# Patient Record
Sex: MALE | Race: BLACK OR AFRICAN AMERICAN | NOT HISPANIC OR LATINO | Employment: OTHER | URBAN - METROPOLITAN AREA
[De-identification: names, ages, dates, MRNs, and addresses within clinical notes are randomized per-mention and may not be internally consistent; named-entity substitution may affect disease eponyms.]

---

## 2017-01-04 ENCOUNTER — OFFICE VISIT (OUTPATIENT)
Dept: SURGERY | Facility: CLINIC | Age: 42
End: 2017-01-04
Payer: MEDICAID

## 2017-01-04 VITALS
WEIGHT: 118.19 LBS | HEART RATE: 97 BPM | DIASTOLIC BLOOD PRESSURE: 101 MMHG | SYSTOLIC BLOOD PRESSURE: 175 MMHG | HEIGHT: 64 IN | BODY MASS INDEX: 20.18 KG/M2

## 2017-01-04 DIAGNOSIS — Z93.2 ILEOSTOMY IN PLACE: Primary | ICD-10-CM

## 2017-01-04 PROCEDURE — 99213 OFFICE O/P EST LOW 20 MIN: CPT | Mod: PBBFAC | Performed by: COLON & RECTAL SURGERY

## 2017-01-04 PROCEDURE — 99024 POSTOP FOLLOW-UP VISIT: CPT | Mod: ,,, | Performed by: COLON & RECTAL SURGERY

## 2017-01-04 PROCEDURE — 99999 PR PBB SHADOW E&M-EST. PATIENT-LVL III: CPT | Mod: PBBFAC,,, | Performed by: COLON & RECTAL SURGERY

## 2017-01-04 NOTE — PROGRESS NOTES
Patient ID:  Marco Valadez Jr. is a 41 y.o. male     Chief Complaint:   Chief Complaint   Patient presents with    Follow-up    continent ileostomy        HPI: 10/31/16 : removal of ileoanal pouch and creation of continent ileostomy    HAs stool leaking around tube and excess output    11/12/16: exp lap for small bowel volvulus and SBO, lysis of adhesions, luis e gasrostomy tube. Bowels working Muro tube removed and replaced without difficuty. No need for G-tube      9/26/16 EUA, placement of setons. 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 Has drainage and nausea.    Had IPAA by Dr Marinelli in 2012 had several leaks and 13 operations. Now with frequent stools and seton in anus. Interested in repair of Gutiérrez Pouch.    ROS:        Constitutional: No fever, chills, activity or appetite change.      HENT: No hearing loss, facial swelling, neck pain or stiffness.       Eyes: No discharge, itching and visual disturbance.      Respiratory: No apnea, cough, choking or shortness of breath.       Cardiovascular: No leg swelling or chest pain      Gastrointestinal: No abdominal distention or change in bowel habbits     Genitourinary: No dysuria, frequency or flank pain.      Musculoskeletal: No arthralgias or gait problem.      Neurological: No dizziness, seizures or weakness.      Hematological: No adenopathy.      Psychiatric/Behavioral: No hallucinations or behavioral problems.       PE:    APPEARANCE: Well nourished, well developed, in no acute distress.   CHEST: Lungs clear. Normal respiratory effort.  CARDIOVASCULAR: Normal rhythm. No edema.  ABDOMEN: Soft. No tenderness or masses. healed incision continent ileostomy in RLQ,  Not inn all the way    Rectum:  Cream on skin vessel lopp in superficial fistula  Musculoskeletal: Symmetric, normal range of motion and strength.   Neurological: Alert and oriented to person, place, and time. Normal reflexes.   Skin: Skin is warm and dry.   Psychiatric: Normal mood  and affect. Behavior is normal and appropriate.      Tupe removed and relaced x 2 without difficulty    Impression:  Continent ileostomy  PLAN:  Start pouch training complete Flagyl RTC 1 month

## 2017-01-09 RX ORDER — HYDROCODONE BITARTRATE AND ACETAMINOPHEN 10; 325 MG/1; MG/1
1 TABLET ORAL EVERY 4 HOURS PRN
Qty: 60 TABLET | Refills: 0 | Status: SHIPPED | OUTPATIENT
Start: 2017-01-09 | End: 2019-08-13

## 2017-01-13 ENCOUNTER — TELEPHONE (OUTPATIENT)
Dept: SURGERY | Facility: CLINIC | Age: 42
End: 2017-01-13

## 2017-01-13 NOTE — TELEPHONE ENCOUNTER
----- Message from Marti Ho sent at 1/13/2017  8:07 AM CST -----  Contact: pt [phone]  Patient states he's having problems with his tubing and Bernie told him to call him this morning. Please call patient

## 2017-01-13 NOTE — TELEPHONE ENCOUNTER
Informed pt he will have to come in for the problems with the tube. The multiple stools are probably from the Citrate of magnesia he did Wed. Instructed him once stool is back to normal he is to do a dose of Miralax daily per Dr Riddle. He is aslo complaining of gas. Told him to get an OTC product for gas and keep a food dairy which may help identify what is causing the gas. Will make an appt if problem get worse with the tube.

## 2017-01-19 ENCOUNTER — PATIENT MESSAGE (OUTPATIENT)
Dept: SURGERY | Facility: CLINIC | Age: 42
End: 2017-01-19

## 2017-01-20 ENCOUNTER — TELEPHONE (OUTPATIENT)
Dept: WOUND CARE | Facility: CLINIC | Age: 42
End: 2017-01-20

## 2017-01-20 NOTE — TELEPHONE ENCOUNTER
Called [facility] regarding his continent ostomy supplies, could not reach danette but left message he needs Janay straight cath 30 fr #[phone]

## 2017-01-20 NOTE — TELEPHONE ENCOUNTER
----- Message from Bernie Rhodes LPN sent at 1/19/2017  2:11 PM CST -----  Contact: Shaylee maria guadalupe [facility]#[phone] ext#[phone]      ----- Message -----     From: Nina Gunter     Sent: 1/19/2017  10:36 AM       To: Venkatesh BROWN Staff    She's calling to see what supplies patient needs. Please call

## 2017-01-25 ENCOUNTER — PATIENT MESSAGE (OUTPATIENT)
Dept: SURGERY | Facility: CLINIC | Age: 42
End: 2017-01-25

## 2017-01-28 ENCOUNTER — PATIENT MESSAGE (OUTPATIENT)
Dept: SURGERY | Facility: CLINIC | Age: 42
End: 2017-01-28

## 2017-01-29 ENCOUNTER — PATIENT MESSAGE (OUTPATIENT)
Dept: SURGERY | Facility: CLINIC | Age: 42
End: 2017-01-29

## 2017-02-01 ENCOUNTER — OFFICE VISIT (OUTPATIENT)
Dept: SURGERY | Facility: CLINIC | Age: 42
End: 2017-02-01
Payer: MEDICAID

## 2017-02-01 VITALS
HEIGHT: 64 IN | DIASTOLIC BLOOD PRESSURE: 94 MMHG | HEART RATE: 84 BPM | TEMPERATURE: 98 F | WEIGHT: 112.88 LBS | SYSTOLIC BLOOD PRESSURE: 157 MMHG | BODY MASS INDEX: 19.27 KG/M2 | RESPIRATION RATE: 18 BRPM

## 2017-02-01 DIAGNOSIS — Z93.2 ILEOSTOMY IN PLACE: Primary | ICD-10-CM

## 2017-02-01 PROCEDURE — 99999 PR PBB SHADOW E&M-EST. PATIENT-LVL III: CPT | Mod: PBBFAC,,, | Performed by: COLON & RECTAL SURGERY

## 2017-02-01 PROCEDURE — 99024 POSTOP FOLLOW-UP VISIT: CPT | Mod: ,,, | Performed by: COLON & RECTAL SURGERY

## 2017-02-01 PROCEDURE — 99213 OFFICE O/P EST LOW 20 MIN: CPT | Mod: PBBFAC | Performed by: COLON & RECTAL SURGERY

## 2017-02-01 RX ORDER — POLYETHYLENE GLYCOL 3350 17 G/17G
POWDER, FOR SOLUTION ORAL
COMMUNITY
End: 2018-04-25

## 2017-02-01 NOTE — PROGRESS NOTES
Patient ID:  Marco Valadez Jr. is a 41 y.o. male     Chief Complaint:   Chief Complaint   Patient presents with    Follow-up        HPI: Doing better tubes were bending. On probiotics      10/31/16 : removal of ileoanal pouch and creation of continent ileostomy    11/12/16: exp lap for small bowel volvulus and SBO, lysis of adhesions, luis e gasrostomy tube. Bowels working Muro tube removed and replaced without difficuty. No need for G-tube      9/26/16 EUA, placement of setons. 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 Has drainage and nausea.    Had IPAA by Dr Marinelli in 2012 had several leaks and 13 operations. Now with frequent stools and seton in anus. Interested in repair of Gutiérrez Pouch.    ROS:        Constitutional: No fever, chills, activity or appetite change.      HENT: No hearing loss, facial swelling, neck pain or stiffness.       Eyes: No discharge, itching and visual disturbance.      Respiratory: No apnea, cough, choking or shortness of breath.       Cardiovascular: No leg swelling or chest pain      Gastrointestinal: No abdominal distention or change in bowel habbits     Genitourinary: No dysuria, frequency or flank pain.      Musculoskeletal: No arthralgias or gait problem.      Neurological: No dizziness, seizures or weakness.      Hematological: No adenopathy.      Psychiatric/Behavioral: No hallucinations or behavioral problems.       PE:    APPEARANCE: Well nourished, well developed, in no acute distress.   CHEST: Lungs clear. Normal respiratory effort.  CARDIOVASCULAR: Normal rhythm. No edema.  ABDOMEN: Soft. No tenderness or masses. healed incision continent ileostomy in RLQ,  Not inn all the way    Rectum:  Cream on skin vessel lopp in superficial fistula  Musculoskeletal: Symmetric, normal range of motion and strength.   Neurological: Alert and oriented to person, place, and time. Normal reflexes.   Skin: Skin is warm and dry.   Psychiatric: Normal mood and affect. Behavior is  normal and appropriate.      Tupe removed and relaced x 2 without difficulty    Impression:  Continent ileostomy  PLAN: Instructed on tube insertion RTC 3 months

## 2017-02-07 ENCOUNTER — PATIENT MESSAGE (OUTPATIENT)
Dept: SURGERY | Facility: CLINIC | Age: 42
End: 2017-02-07

## 2017-02-08 ENCOUNTER — PATIENT MESSAGE (OUTPATIENT)
Dept: SURGERY | Facility: CLINIC | Age: 42
End: 2017-02-08

## 2017-02-09 ENCOUNTER — PATIENT MESSAGE (OUTPATIENT)
Dept: SURGERY | Facility: CLINIC | Age: 42
End: 2017-02-09

## 2017-02-10 RX ORDER — METRONIDAZOLE 250 MG/1
250 TABLET ORAL 3 TIMES DAILY
Qty: 90 TABLET | Refills: 2 | Status: SHIPPED | OUTPATIENT
Start: 2017-02-10 | End: 2019-12-09 | Stop reason: SDUPTHER

## 2017-02-10 RX ORDER — DICYCLOMINE HYDROCHLORIDE 20 MG/1
20 TABLET ORAL EVERY 6 HOURS
Qty: 120 TABLET | Refills: 0 | Status: SHIPPED | OUTPATIENT
Start: 2017-02-10 | End: 2017-03-13

## 2017-02-15 ENCOUNTER — OFFICE VISIT (OUTPATIENT)
Dept: SURGERY | Facility: CLINIC | Age: 42
End: 2017-02-15
Payer: MEDICAID

## 2017-02-15 VITALS
HEIGHT: 64 IN | DIASTOLIC BLOOD PRESSURE: 81 MMHG | SYSTOLIC BLOOD PRESSURE: 132 MMHG | HEART RATE: 95 BPM | BODY MASS INDEX: 20.06 KG/M2 | WEIGHT: 117.5 LBS

## 2017-02-15 DIAGNOSIS — Z93.2 ILEOSTOMY IN PLACE: Primary | ICD-10-CM

## 2017-02-15 PROCEDURE — 99999 PR PBB SHADOW E&M-EST. PATIENT-LVL III: CPT | Mod: PBBFAC,,, | Performed by: COLON & RECTAL SURGERY

## 2017-02-15 PROCEDURE — 99024 POSTOP FOLLOW-UP VISIT: CPT | Mod: ,,, | Performed by: COLON & RECTAL SURGERY

## 2017-02-15 PROCEDURE — 99213 OFFICE O/P EST LOW 20 MIN: CPT | Mod: PBBFAC | Performed by: COLON & RECTAL SURGERY

## 2017-02-15 NOTE — PROGRESS NOTES
Patient ID:  Marco Valadez Jr. is a 41 y.o. male     Chief Complaint:   Chief Complaint   Patient presents with    Follow-up    continent ileostomy        HPI: A ssome leakage from stoma and has to cannulate frequently.    10/31/16 : removal of ileoanal pouch and creation of continent ileostomy    11/12/16: exp lap for small bowel volvulus and SBO, lysis of adhesions, luis e gasrostomy tube. Bowels working Muro tube removed and replaced without difficuty. No need for G-tube      9/26/16 EUA, placement of setons. 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 Has drainage and nausea.    Had IPAA by Dr Marinelli in 2012 had several leaks and 13 operations. Now with frequent stools and seton in anus. Interested in repair of Gutiérrez Pouch.    ROS:        Constitutional: No fever, chills, activity or appetite change.      HENT: No hearing loss, facial swelling, neck pain or stiffness.       Eyes: No discharge, itching and visual disturbance.      Respiratory: No apnea, cough, choking or shortness of breath.       Cardiovascular: No leg swelling or chest pain      Gastrointestinal: No abdominal distention or change in bowel habbits     Genitourinary: No dysuria, frequency or flank pain.      Musculoskeletal: No arthralgias or gait problem.      Neurological: No dizziness, seizures or weakness.      Hematological: No adenopathy.      Psychiatric/Behavioral: No hallucinations or behavioral problems.       PE:    APPEARANCE: Well nourished, well developed, in no acute distress.   CHEST: Lungs clear. Normal respiratory effort.  CARDIOVASCULAR: Normal rhythm. No edema.  ABDOMEN: Soft. No tenderness or masses. healed incision continent ileostomy in RLQ,  Not inn all the way    Rectum:  Cream on skin vessel lopp in superficial fistula  Musculoskeletal: Symmetric, normal range of motion and strength.   Neurological: Alert and oriented to person, place, and time. Normal reflexes.   Skin: Skin is warm and dry.   Psychiatric:  Normal mood and affect. Behavior is normal and appropriate.     PROCEDURE: Flexible Endoscopy of pouch   Scope # Olymp [id number]    With informed consent the flexible sigmoidoscope was passed 15  cm under direcet vision. Prep quality: fair    Findings: dombbellshaped pouch with adequate intussception. Distance of valve measured and patient given a tube with marking  EBL: None    The procedure was tolerated well.        Impression:  Continent ileostomy  PLAN: Instructed on tube insertion RTC 1 months

## 2017-02-16 ENCOUNTER — PATIENT MESSAGE (OUTPATIENT)
Dept: SURGERY | Facility: CLINIC | Age: 42
End: 2017-02-16

## 2017-02-20 ENCOUNTER — PATIENT MESSAGE (OUTPATIENT)
Dept: SURGERY | Facility: CLINIC | Age: 42
End: 2017-02-20

## 2017-02-21 ENCOUNTER — PATIENT MESSAGE (OUTPATIENT)
Dept: SURGERY | Facility: CLINIC | Age: 42
End: 2017-02-21

## 2017-02-22 ENCOUNTER — OFFICE VISIT (OUTPATIENT)
Dept: SURGERY | Facility: CLINIC | Age: 42
End: 2017-02-22
Payer: MEDICARE

## 2017-02-22 VITALS
HEIGHT: 64 IN | SYSTOLIC BLOOD PRESSURE: 144 MMHG | HEART RATE: 77 BPM | BODY MASS INDEX: 20.36 KG/M2 | DIASTOLIC BLOOD PRESSURE: 92 MMHG | WEIGHT: 119.25 LBS

## 2017-02-22 DIAGNOSIS — K94.13 ILEOSTOMY DYSFUNCTION: Primary | ICD-10-CM

## 2017-02-22 DIAGNOSIS — Z93.2 ILEOSTOMY IN PLACE: ICD-10-CM

## 2017-02-22 PROCEDURE — 99214 OFFICE O/P EST MOD 30 MIN: CPT | Mod: S$PBB,,, | Performed by: COLON & RECTAL SURGERY

## 2017-02-22 PROCEDURE — 99213 OFFICE O/P EST LOW 20 MIN: CPT | Mod: PBBFAC | Performed by: COLON & RECTAL SURGERY

## 2017-02-22 PROCEDURE — 99999 PR PBB SHADOW E&M-EST. PATIENT-LVL III: CPT | Mod: PBBFAC,,, | Performed by: COLON & RECTAL SURGERY

## 2017-02-22 RX ORDER — METRONIDAZOLE 500 MG/100ML
500 INJECTION, SOLUTION INTRAVENOUS
Status: CANCELLED | OUTPATIENT
Start: 2017-02-22

## 2017-02-22 RX ORDER — ACETAMINOPHEN 10 MG/ML
1000 INJECTION, SOLUTION INTRAVENOUS
Status: CANCELLED | OUTPATIENT
Start: 2017-02-22 | End: 2017-02-22

## 2017-02-22 RX ORDER — SODIUM CHLORIDE 9 MG/ML
INJECTION, SOLUTION INTRAVENOUS CONTINUOUS
Status: CANCELLED | OUTPATIENT
Start: 2017-02-22

## 2017-02-22 NOTE — H&P
Marco Valadez Jr. is a 41 y.o. male     Chief Complaint:   Chief Complaint   Patient presents with    Follow-up        HPI:Continues to leak from stoma. Had endosocpy last visit with bilobed pouch May not be adequately emptying      10/31/16 : removal of ileoanal pouch and creation of continent ileostomy    11/12/16: exp lap for small bowel volvulus and SBO, lysis of adhesions, luis e gasrostomy tube. Bowels working Muro tube removed and replaced without difficuty. No need for G-tube      9/26/16 EUA, placement of setons. 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 Has drainage and nausea.    Had IPAA by Dr Marinelli in 2012 had several leaks and 13 operations. Now with frequent stools and seton in anus. Interested in repair of Gutiérrez Pouch.    ROS:        Constitutional: No fever, chills, activity or appetite change.      HENT: No hearing loss, facial swelling, neck pain or stiffness.       Eyes: No discharge, itching and visual disturbance.      Respiratory: No apnea, cough, choking or shortness of breath.       Cardiovascular: No leg swelling or chest pain      Gastrointestinal: No abdominal distention or change in bowel habbits     Genitourinary: No dysuria, frequency or flank pain.      Musculoskeletal: No arthralgias or gait problem.      Neurological: No dizziness, seizures or weakness.      Hematological: No adenopathy.      Psychiatric/Behavioral: No hallucinations or behavioral problems.       PE:    APPEARANCE: Well nourished, well developed, in no acute distress.   CHEST: Lungs clear. Normal respiratory effort.  CARDIOVASCULAR: Normal rhythm. No edema.  ABDOMEN: Soft. No tenderness or masses. healed incision continent ileostomy in RLQ,  Not inn all the way    Rectum:  Cream on skin vessel lopp in superficial fistula  Musculoskeletal: Symmetric, normal range of motion and strength.   Neurological: Alert and oriented to person, place, and time. Normal reflexes.   Skin: Skin is warm and dry.    Psychiatric: Normal mood and affect. Behavior is normal and appropriate.          Impression:  Continent ileostomy with incontinence  PLAN: DIscussed options. Will explore and revise pouch March 6.  I have explained the procedure including indications, alternatives, expected outcomes and potential complications. The patient appears to understand and gives informed consent. The patient is medically ready for surgery.

## 2017-02-22 NOTE — PROGRESS NOTES
Patient ID:  Marco Valadez Jr. is a 41 y.o. male     Chief Complaint:   Chief Complaint   Patient presents with    Follow-up        HPI:Continues to leak from stoma. Had endosocpy last visit with bilobed pouch May not be adequately emptying      10/31/16 : removal of ileoanal pouch and creation of continent ileostomy    11/12/16: exp lap for small bowel volvulus and SBO, lysis of adhesions, luis e gasrostomy tube. Bowels working Muro tube removed and replaced without difficuty. No need for G-tube      9/26/16 EUA, placement of setons. 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 Has drainage and nausea.    Had IPAA by Dr Marinelli in 2012 had several leaks and 13 operations. Now with frequent stools and seton in anus. Interested in repair of Gutiérrez Pouch.    ROS:        Constitutional: No fever, chills, activity or appetite change.      HENT: No hearing loss, facial swelling, neck pain or stiffness.       Eyes: No discharge, itching and visual disturbance.      Respiratory: No apnea, cough, choking or shortness of breath.       Cardiovascular: No leg swelling or chest pain      Gastrointestinal: No abdominal distention or change in bowel habbits     Genitourinary: No dysuria, frequency or flank pain.      Musculoskeletal: No arthralgias or gait problem.      Neurological: No dizziness, seizures or weakness.      Hematological: No adenopathy.      Psychiatric/Behavioral: No hallucinations or behavioral problems.       PE:    APPEARANCE: Well nourished, well developed, in no acute distress.   CHEST: Lungs clear. Normal respiratory effort.  CARDIOVASCULAR: Normal rhythm. No edema.  ABDOMEN: Soft. No tenderness or masses. healed incision continent ileostomy in RLQ,  Not inn all the way    Rectum:  Cream on skin vessel lopp in superficial fistula  Musculoskeletal: Symmetric, normal range of motion and strength.   Neurological: Alert and oriented to person, place, and time. Normal reflexes.   Skin: Skin is warm and  dry.   Psychiatric: Normal mood and affect. Behavior is normal and appropriate.          Impression:  Continent ileostomy with incontinence  PLAN: DIscussed options. Will explore and revise pouch March 6.  I have explained the procedure including indications, alternatives, expected outcomes and potential complications. The patient appears to understand and gives informed consent. The patient is medically ready for surgery.

## 2017-02-22 NOTE — PROGRESS NOTES
PreOp instructions reviewed with pt: NPO after MN, anti-bacterial showering guidelines, and bowel prep: Clears - Clear liquids only.  Clinic nurse's contact info provided - instructed to call day prior to surgery to confirm arrival time; also if any questions or scheduling concerns.  Pt verbalized understanding and given written copies of all information provided.

## 2017-03-03 NOTE — PRE-PROCEDURE INSTRUCTIONS
Preop instructions: NPO after midnight or 8 hours prior to procedure time, shower instructions, directions, leave all valuables at home, medication instructions for PM prior & am of procedure explained. Patient stated an understanding.    Patient denies any side effects or issues with anesthesia or sedation.

## 2017-03-06 ENCOUNTER — ANESTHESIA EVENT (OUTPATIENT)
Dept: SURGERY | Facility: HOSPITAL | Age: 42
DRG: 330 | End: 2017-03-06
Payer: MEDICARE

## 2017-03-06 ENCOUNTER — HOSPITAL ENCOUNTER (INPATIENT)
Facility: HOSPITAL | Age: 42
LOS: 3 days | Discharge: HOME | DRG: 330 | End: 2017-03-09
Attending: COLON & RECTAL SURGERY | Admitting: COLON & RECTAL SURGERY
Payer: MEDICARE

## 2017-03-06 ENCOUNTER — ANESTHESIA (OUTPATIENT)
Dept: SURGERY | Facility: HOSPITAL | Age: 42
DRG: 330 | End: 2017-03-06
Payer: MEDICARE

## 2017-03-06 DIAGNOSIS — K94.13 ILEOSTOMY DYSFUNCTION: Primary | ICD-10-CM

## 2017-03-06 PROCEDURE — 63600175 PHARM REV CODE 636 W HCPCS: Performed by: SURGERY

## 2017-03-06 PROCEDURE — 27100025 HC TUBING, SET FLUID WARMER: Performed by: NURSE ANESTHETIST, CERTIFIED REGISTERED

## 2017-03-06 PROCEDURE — 25000003 PHARM REV CODE 250: Performed by: NURSE ANESTHETIST, CERTIFIED REGISTERED

## 2017-03-06 PROCEDURE — D9220A PRA ANESTHESIA: Mod: ANES,,, | Performed by: ANESTHESIOLOGY

## 2017-03-06 PROCEDURE — 20600001 HC STEP DOWN PRIVATE ROOM

## 2017-03-06 PROCEDURE — 36000708 HC OR TIME LEV III 1ST 15 MIN: Performed by: COLON & RECTAL SURGERY

## 2017-03-06 PROCEDURE — D9220A PRA ANESTHESIA: Mod: CRNA,,, | Performed by: NURSE ANESTHETIST, CERTIFIED REGISTERED

## 2017-03-06 PROCEDURE — 71000033 HC RECOVERY, INTIAL HOUR: Performed by: COLON & RECTAL SURGERY

## 2017-03-06 PROCEDURE — 25000003 PHARM REV CODE 250: Performed by: COLON & RECTAL SURGERY

## 2017-03-06 PROCEDURE — 37000009 HC ANESTHESIA EA ADD 15 MINS: Performed by: COLON & RECTAL SURGERY

## 2017-03-06 PROCEDURE — 25000003 PHARM REV CODE 250: Performed by: STUDENT IN AN ORGANIZED HEALTH CARE EDUCATION/TRAINING PROGRAM

## 2017-03-06 PROCEDURE — 36000709 HC OR TIME LEV III EA ADD 15 MIN: Performed by: COLON & RECTAL SURGERY

## 2017-03-06 PROCEDURE — 27201423 OPTIME MED/SURG SUP & DEVICES STERILE SUPPLY: Performed by: COLON & RECTAL SURGERY

## 2017-03-06 PROCEDURE — 25000003 PHARM REV CODE 250: Performed by: SURGERY

## 2017-03-06 PROCEDURE — 63600175 PHARM REV CODE 636 W HCPCS: Performed by: COLON & RECTAL SURGERY

## 2017-03-06 PROCEDURE — 37000008 HC ANESTHESIA 1ST 15 MINUTES: Performed by: COLON & RECTAL SURGERY

## 2017-03-06 PROCEDURE — 63600175 PHARM REV CODE 636 W HCPCS: Performed by: NURSE ANESTHETIST, CERTIFIED REGISTERED

## 2017-03-06 PROCEDURE — 44312 REVISION OF ILEOSTOMY: CPT | Mod: ,,, | Performed by: COLON & RECTAL SURGERY

## 2017-03-06 PROCEDURE — 0DQB0ZZ REPAIR ILEUM, OPEN APPROACH: ICD-10-PCS | Performed by: COLON & RECTAL SURGERY

## 2017-03-06 PROCEDURE — 71000039 HC RECOVERY, EACH ADD'L HOUR: Performed by: COLON & RECTAL SURGERY

## 2017-03-06 PROCEDURE — 25000003 PHARM REV CODE 250: Performed by: ANESTHESIOLOGY

## 2017-03-06 RX ORDER — LIDOCAINE HYDROCHLORIDE 10 MG/ML
1 INJECTION, SOLUTION EPIDURAL; INFILTRATION; INTRACAUDAL; PERINEURAL ONCE
Status: COMPLETED | OUTPATIENT
Start: 2017-03-06 | End: 2017-03-06

## 2017-03-06 RX ORDER — SODIUM CHLORIDE 9 MG/ML
INJECTION, SOLUTION INTRAVENOUS CONTINUOUS
Status: DISCONTINUED | OUTPATIENT
Start: 2017-03-06 | End: 2017-03-06

## 2017-03-06 RX ORDER — ACETAMINOPHEN 10 MG/ML
1000 INJECTION, SOLUTION INTRAVENOUS
Status: COMPLETED | OUTPATIENT
Start: 2017-03-06 | End: 2017-03-06

## 2017-03-06 RX ORDER — ENOXAPARIN SODIUM 100 MG/ML
40 INJECTION SUBCUTANEOUS
Status: DISCONTINUED | OUTPATIENT
Start: 2017-03-07 | End: 2017-03-09 | Stop reason: HOSPADM

## 2017-03-06 RX ORDER — BUPIVACAINE HYDROCHLORIDE 2.5 MG/ML
INJECTION, SOLUTION EPIDURAL; INFILTRATION; INTRACAUDAL
Status: DISCONTINUED | OUTPATIENT
Start: 2017-03-06 | End: 2017-03-06

## 2017-03-06 RX ORDER — PROPOFOL 10 MG/ML
VIAL (ML) INTRAVENOUS
Status: DISCONTINUED | OUTPATIENT
Start: 2017-03-06 | End: 2017-03-06

## 2017-03-06 RX ORDER — ACETAMINOPHEN 325 MG/1
650 TABLET ORAL EVERY 4 HOURS
Status: DISPENSED | OUTPATIENT
Start: 2017-03-06 | End: 2017-03-07

## 2017-03-06 RX ORDER — GLYCOPYRROLATE 0.2 MG/ML
INJECTION INTRAMUSCULAR; INTRAVENOUS
Status: DISCONTINUED | OUTPATIENT
Start: 2017-03-06 | End: 2017-03-06

## 2017-03-06 RX ORDER — ROCURONIUM BROMIDE 10 MG/ML
INJECTION, SOLUTION INTRAVENOUS
Status: DISCONTINUED | OUTPATIENT
Start: 2017-03-06 | End: 2017-03-06

## 2017-03-06 RX ORDER — NALOXONE HCL 0.4 MG/ML
0.02 VIAL (ML) INJECTION
Status: DISCONTINUED | OUTPATIENT
Start: 2017-03-06 | End: 2017-03-07

## 2017-03-06 RX ORDER — IPRATROPIUM BROMIDE AND ALBUTEROL SULFATE 2.5; .5 MG/3ML; MG/3ML
3 SOLUTION RESPIRATORY (INHALATION) EVERY 4 HOURS PRN
Status: DISCONTINUED | OUTPATIENT
Start: 2017-03-06 | End: 2017-03-06 | Stop reason: HOSPADM

## 2017-03-06 RX ORDER — SUCCINYLCHOLINE CHLORIDE 20 MG/ML
INJECTION INTRAMUSCULAR; INTRAVENOUS
Status: DISCONTINUED | OUTPATIENT
Start: 2017-03-06 | End: 2017-03-06

## 2017-03-06 RX ORDER — ONDANSETRON 2 MG/ML
4 INJECTION INTRAMUSCULAR; INTRAVENOUS EVERY 8 HOURS PRN
Status: DISCONTINUED | OUTPATIENT
Start: 2017-03-06 | End: 2017-03-09 | Stop reason: HOSPADM

## 2017-03-06 RX ORDER — KETAMINE HCL IN 0.9 % NACL 50 MG/5 ML
SYRINGE (ML) INTRAVENOUS
Status: DISCONTINUED | OUTPATIENT
Start: 2017-03-06 | End: 2017-03-06

## 2017-03-06 RX ORDER — LIDOCAINE HCL/PF 100 MG/5ML
SYRINGE (ML) INTRAVENOUS
Status: DISCONTINUED | OUTPATIENT
Start: 2017-03-06 | End: 2017-03-06

## 2017-03-06 RX ORDER — METRONIDAZOLE 500 MG/100ML
500 INJECTION, SOLUTION INTRAVENOUS
Status: COMPLETED | OUTPATIENT
Start: 2017-03-06 | End: 2017-03-06

## 2017-03-06 RX ORDER — DIPHENHYDRAMINE HYDROCHLORIDE 50 MG/ML
12.5 INJECTION INTRAMUSCULAR; INTRAVENOUS EVERY 4 HOURS PRN
Status: DISCONTINUED | OUTPATIENT
Start: 2017-03-06 | End: 2017-03-09 | Stop reason: HOSPADM

## 2017-03-06 RX ORDER — NEOSTIGMINE METHYLSULFATE 1 MG/ML
INJECTION, SOLUTION INTRAVENOUS
Status: DISCONTINUED | OUTPATIENT
Start: 2017-03-06 | End: 2017-03-06

## 2017-03-06 RX ORDER — CEFAZOLIN SODIUM 1 G/3ML
INJECTION, POWDER, FOR SOLUTION INTRAMUSCULAR; INTRAVENOUS
Status: DISCONTINUED | OUTPATIENT
Start: 2017-03-06 | End: 2017-03-06

## 2017-03-06 RX ORDER — POLYETHYLENE GLYCOL 3350 17 G/17G
17 POWDER, FOR SOLUTION ORAL DAILY
Status: DISCONTINUED | OUTPATIENT
Start: 2017-03-06 | End: 2017-03-09 | Stop reason: HOSPADM

## 2017-03-06 RX ORDER — MIDAZOLAM HYDROCHLORIDE 1 MG/ML
INJECTION, SOLUTION INTRAMUSCULAR; INTRAVENOUS
Status: DISCONTINUED | OUTPATIENT
Start: 2017-03-06 | End: 2017-03-06

## 2017-03-06 RX ORDER — SODIUM CHLORIDE 9 MG/ML
INJECTION, SOLUTION INTRAVENOUS CONTINUOUS
Status: DISCONTINUED | OUTPATIENT
Start: 2017-03-06 | End: 2017-03-08

## 2017-03-06 RX ORDER — ONDANSETRON 2 MG/ML
INJECTION INTRAMUSCULAR; INTRAVENOUS
Status: DISCONTINUED | OUTPATIENT
Start: 2017-03-06 | End: 2017-03-06

## 2017-03-06 RX ORDER — VALSARTAN 160 MG/1
320 TABLET ORAL DAILY
Status: DISCONTINUED | OUTPATIENT
Start: 2017-03-06 | End: 2017-03-07

## 2017-03-06 RX ORDER — HYDROMORPHONE HCL IN 0.9% NACL 6 MG/30 ML
PATIENT CONTROLLED ANALGESIA SYRINGE INTRAVENOUS CONTINUOUS
Status: DISCONTINUED | OUTPATIENT
Start: 2017-03-06 | End: 2017-03-07

## 2017-03-06 RX ORDER — FENTANYL CITRATE 50 UG/ML
INJECTION, SOLUTION INTRAMUSCULAR; INTRAVENOUS
Status: DISCONTINUED | OUTPATIENT
Start: 2017-03-06 | End: 2017-03-06

## 2017-03-06 RX ORDER — DICYCLOMINE HYDROCHLORIDE 20 MG/1
20 TABLET ORAL EVERY 6 HOURS
Status: DISCONTINUED | OUTPATIENT
Start: 2017-03-06 | End: 2017-03-09 | Stop reason: HOSPADM

## 2017-03-06 RX ADMIN — ROCURONIUM BROMIDE 25 MG: 10 INJECTION, SOLUTION INTRAVENOUS at 07:03

## 2017-03-06 RX ADMIN — Medication 10 MG: at 07:03

## 2017-03-06 RX ADMIN — MIDAZOLAM HYDROCHLORIDE 2 MG: 1 INJECTION, SOLUTION INTRAMUSCULAR; INTRAVENOUS at 07:03

## 2017-03-06 RX ADMIN — SUCCINYLCHOLINE CHLORIDE 160 MG: 20 INJECTION, SOLUTION INTRAMUSCULAR; INTRAVENOUS at 07:03

## 2017-03-06 RX ADMIN — SODIUM CHLORIDE: 0.9 INJECTION, SOLUTION INTRAVENOUS at 10:03

## 2017-03-06 RX ADMIN — Medication 800 MG: at 06:03

## 2017-03-06 RX ADMIN — GLYCOPYRROLATE 0.4 MG: 0.2 INJECTION, SOLUTION INTRAMUSCULAR; INTRAVENOUS at 09:03

## 2017-03-06 RX ADMIN — Medication: at 10:03

## 2017-03-06 RX ADMIN — ACETAMINOPHEN 650 MG: 325 TABLET ORAL at 10:03

## 2017-03-06 RX ADMIN — ONDANSETRON 4 MG: 2 INJECTION INTRAMUSCULAR; INTRAVENOUS at 09:03

## 2017-03-06 RX ADMIN — NEOSTIGMINE METHYLSULFATE 4 MG: 1 INJECTION INTRAVENOUS at 09:03

## 2017-03-06 RX ADMIN — ACETAMINOPHEN 650 MG: 325 TABLET ORAL at 02:03

## 2017-03-06 RX ADMIN — FENTANYL CITRATE 50 MCG: 50 INJECTION, SOLUTION INTRAMUSCULAR; INTRAVENOUS at 07:03

## 2017-03-06 RX ADMIN — METRONIDAZOLE 500 MG: 500 SOLUTION INTRAVENOUS at 07:03

## 2017-03-06 RX ADMIN — PROPOFOL 150 MG: 10 INJECTION, EMULSION INTRAVENOUS at 07:03

## 2017-03-06 RX ADMIN — SODIUM CHLORIDE 1000 ML: 0.9 INJECTION, SOLUTION INTRAVENOUS at 08:03

## 2017-03-06 RX ADMIN — ACETAMINOPHEN 650 MG: 325 TABLET ORAL at 06:03

## 2017-03-06 RX ADMIN — Medication: at 06:03

## 2017-03-06 RX ADMIN — VALSARTAN 320 MG: 160 TABLET, FILM COATED ORAL at 12:03

## 2017-03-06 RX ADMIN — SODIUM CHLORIDE: 0.9 INJECTION, SOLUTION INTRAVENOUS at 08:03

## 2017-03-06 RX ADMIN — Medication 20 MG: at 07:03

## 2017-03-06 RX ADMIN — SODIUM CHLORIDE, SODIUM GLUCONATE, SODIUM ACETATE, POTASSIUM CHLORIDE, MAGNESIUM CHLORIDE, SODIUM PHOSPHATE, DIBASIC, AND POTASSIUM PHOSPHATE: .53; .5; .37; .037; .03; .012; .00082 INJECTION, SOLUTION INTRAVENOUS at 08:03

## 2017-03-06 RX ADMIN — LIDOCAINE HYDROCHLORIDE 90 MG: 20 INJECTION, SOLUTION INTRAVENOUS at 07:03

## 2017-03-06 RX ADMIN — SODIUM CHLORIDE, SODIUM GLUCONATE, SODIUM ACETATE, POTASSIUM CHLORIDE, MAGNESIUM CHLORIDE, SODIUM PHOSPHATE, DIBASIC, AND POTASSIUM PHOSPHATE: .53; .5; .37; .037; .03; .012; .00082 INJECTION, SOLUTION INTRAVENOUS at 09:03

## 2017-03-06 RX ADMIN — ROCURONIUM BROMIDE 10 MG: 10 INJECTION, SOLUTION INTRAVENOUS at 08:03

## 2017-03-06 RX ADMIN — GENTAMICIN SULFATE 271 MG: 40 INJECTION, SOLUTION INTRAMUSCULAR; INTRAVENOUS at 07:03

## 2017-03-06 RX ADMIN — FENTANYL CITRATE 100 MCG: 50 INJECTION, SOLUTION INTRAMUSCULAR; INTRAVENOUS at 07:03

## 2017-03-06 RX ADMIN — SODIUM CHLORIDE: 0.9 INJECTION, SOLUTION INTRAVENOUS at 05:03

## 2017-03-06 RX ADMIN — DICYCLOMINE HYDROCHLORIDE 20 MG: 20 TABLET ORAL at 06:03

## 2017-03-06 RX ADMIN — PROPOFOL 50 MG: 10 INJECTION, EMULSION INTRAVENOUS at 07:03

## 2017-03-06 RX ADMIN — DICYCLOMINE HYDROCHLORIDE 20 MG: 20 TABLET ORAL at 12:03

## 2017-03-06 RX ADMIN — ACETAMINOPHEN 1000 MG: 10 INJECTION, SOLUTION INTRAVENOUS at 05:03

## 2017-03-06 RX ADMIN — SODIUM CHLORIDE 1000 ML: 0.9 INJECTION, SOLUTION INTRAVENOUS at 06:03

## 2017-03-06 RX ADMIN — POLYETHYLENE GLYCOL 3350 17 G: 17 POWDER, FOR SOLUTION ORAL at 10:03

## 2017-03-06 RX ADMIN — SODIUM CHLORIDE, SODIUM GLUCONATE, SODIUM ACETATE, POTASSIUM CHLORIDE, MAGNESIUM CHLORIDE, SODIUM PHOSPHATE, DIBASIC, AND POTASSIUM PHOSPHATE: .53; .5; .37; .037; .03; .012; .00082 INJECTION, SOLUTION INTRAVENOUS at 07:03

## 2017-03-06 RX ADMIN — ROCURONIUM BROMIDE 5 MG: 10 INJECTION, SOLUTION INTRAVENOUS at 07:03

## 2017-03-06 RX ADMIN — SODIUM CHLORIDE: 0.9 INJECTION, SOLUTION INTRAVENOUS at 06:03

## 2017-03-06 RX ADMIN — Medication 20 MG: at 08:03

## 2017-03-06 RX ADMIN — DIPHENHYDRAMINE HYDROCHLORIDE 12.5 MG: 50 INJECTION, SOLUTION INTRAMUSCULAR; INTRAVENOUS at 02:03

## 2017-03-06 RX ADMIN — LIDOCAINE HYDROCHLORIDE 0.1 ML: 10 INJECTION, SOLUTION EPIDURAL; INFILTRATION; INTRACAUDAL; PERINEURAL at 05:03

## 2017-03-06 RX ADMIN — SUCCINYLCHOLINE CHLORIDE 40 MG: 20 INJECTION, SOLUTION INTRAMUSCULAR; INTRAVENOUS at 07:03

## 2017-03-06 NOTE — OP NOTE
[facility]  General Surgery  Operative Note    SUMMARY     Date of Procedure: 3/6/2017     Procedure: Procedure(s) (LRB):  REVISION-ILEOSTOMY, complicated, revise/repair continent ileostomy (N/A)       Surgeon(s) and Role:     * Mateusz Riddle MD - Primary     * Solitario Odonnell MD - Resident - Assisting    Pre-Operative Diagnosis: Ileostomy dysfunction [K94.13]    Post-Operative Diagnosis: Post-Op Diagnosis Codes:     * Ileostomy dysfunction [K94.13]    Anesthesia: General    HPI: Mr. Valadez is a 40 yo M with hx continent ileostomy not functioning properly here for ileostomy revision.    Description of the Findings of the Procedure: Reduction of continent ileostomy with resultant malfunction    Procedure in Detail: Mr. Valadez was intubated under general anesthesia and his abdomen prepped and draped in sterile fashion. His lower midline incision was re-opened and adhesiolysis was performed. His stoma was then taken down by incising the muco-cutaneous border and sharply dissecting down to the fascia. The intussuscepted bowel appeared to have reduced from the pouch. The pouch was opened and the bowel re-intussuscepted into the pouch lumen. It was fixed with three full thickness TA staple loads and the pouch was then closed in two layers with 3-0 ethibond suture. The stoma was easily intubated and filled with betadine solution and noted to be continent. The cuff of the pouch was secured to the terminal small bowel with 3-0 ethibond sutures and then fixed to the abdominal wall as the bowel was passed through the aperture. The stoma was matured with 3-0 chromic suture and again intubated and tested for continence. The abdomen was irrigated with antibiotic solution and Exparel was infiltrated into the fascia and subcutaneous tissue. The fascia was closed with #1 PDS suture and the skin run with 4-0 monocryl and Ligabond. Mr Valadez tolerated his procedure well, was extubated in the operating room and  transported to recovery in stable condition.    Complications: No    Estimated Blood Loss (EBL): 50cc           Implants: * No implants in log *    Specimens:   Specimen     None                  Condition: Good    Disposition: PACU - hemodynamically stable.

## 2017-03-06 NOTE — ANESTHESIA PREPROCEDURE EVALUATION
03/06/2017           Prev airway:   Present Prior to Hospital Arrival?: No; Placement Date: 10/31/16; Placement Time: 0717; Method of Intubation: Direct laryngoscopy; Inserted by: CRNA; Airway Device: Endotracheal Tube; Mask Ventilation: Easy; Intubated: Postinduction; Blade: Rosenberg #2; Airway Device Size: 8.0; Style: Cuffed; Cuff Inflation: Minimal occlusive pressure; Inflation Amount: 4; Placement Verified By: Capnometry, Auscultation; Grade: Grade I; Complicating Factors: None; Intubation Findings: Positive EtCO2, Bilateral breath sounds, Atraumatic/Condition of teeth unchanged;  Depth of Insertion: 21; Securment: Lips; Complications: None; Breath Sounds: Equal Bilateral; Insertion Attempts: 1; Removal Date: 10/31/16;  Removal Time: 1140; Removal Indication & Assessment: removed per order; Name of Person who Removed: Dr. ILIA Thomas (anesthesia)    Drips:   - NaCl Infusion   - TPN    Patient Active Problem List   Diagnosis    Inflammation of internal ileoanal pouch    Ileostomy in place    Ileostomy dysfunction       Review of patient's allergies indicates:   Allergen Reactions    Penicillins Other (See Comments)     Shakes uncontrollably        No current facility-administered medications on file prior to encounter.      Current Outpatient Prescriptions on File Prior to Encounter   Medication Sig Dispense Refill    dicyclomine (BENTYL) 20 mg tablet Take 1 tablet (20 mg total) by mouth every 6 (six) hours. 120 tablet 0    hydrocodone-acetaminophen 10-325mg (NORCO)  mg Tab Take 1 tablet by mouth every 4 (four) hours as needed for Pain. 60 tablet 0    Lactobacillus rhamnosus GG (CULTURELLE) 10 billion cell capsule Take 1 capsule by mouth once daily.      polyethylene glycol (GLYCOLAX) 17 gram PwPk Take by mouth.      valsartan (DIOVAN) 320 MG tablet Take 1 tablet (320 mg total) by mouth once  daily. 30 tablet 0       Past Surgical History:   Procedure Laterality Date    ILEOSTOMY      SHOULDER SURGERY Left        Social History     Social History    Marital status:      Spouse name: N/A    Number of children: N/A    Years of education: N/A     Occupational History    Not on file.     Social History Main Topics    Smoking status: Never Smoker    Smokeless tobacco: Never Used    Alcohol use No    Drug use: No    Sexual activity: Not on file     Other Topics Concern    Not on file     Social History Narrative         Vital Signs Range (Last 24H):  Temp:  [36.9 °C (98.4 °F)]   Pulse:  [107]   Resp:  [20]   BP: (120)/(87)   SpO2:  [100 %]       CBC:   No results for input(s): WBC, RBC, HGB, HCT, PLT, MCV, MCH, MCHC in the last 72 hours.    CMP:   No results for input(s): NA, K, CL, CO2, BUN, CREATININE, GLU, MG, PHOS, CALCIUM, ALBUMIN, PROT, ALKPHOS, ALT, AST, BILITOT in the last 72 hours.    INR  No results for input(s): INR, PROTIME, APTT in the last 72 hours.    Invalid input(s): PT        Diagnostic Studies:  XRay Abdomen 11/11/16:  2 views of the abdomen, comparison 11/07/2016.  NG tube stable.  No free air.  Surgical clips gallbladder bed and previous surgical clips overlying L2 now situated over left margin, previously on right.  Peritoneal dialysis catheter stable.    There is nondistended air portions of large bowel.  Small bowel distention upper abdomen, proximal mid small bowel, distention moderate degree with air fluid levels stable.    EKG: None      2D Echo: None          [facility] Anesthesia Evaluation    I have reviewed the Patient Summary Reports.    I have reviewed the Nursing Notes.   I have reviewed the Medications.     Review of Systems  Anesthesia Hx:  No problems with previous Anesthesia  History of prior surgery of interest to airway management or planning: Previous anesthesia: General Denies Family Hx of Anesthesia complications.   Denies Personal Hx of Anesthesia  complications.   Social:  Non-Smoker, No Alcohol Use    Hematology/Oncology:  Hematology Normal   Oncology Normal     EENT/Dental:EENT/Dental Normal   Cardiovascular:  Cardiovascular Normal     Pulmonary:  Pulmonary Normal    Renal/:  Renal/ Normal     Hepatic/GI:   Ulcerative colitis   Musculoskeletal:  Musculoskeletal Normal    Neurological:  Neurology Normal    Endocrine:  Endocrine Normal    Psych:  Psychiatric Normal           Physical Exam  General:  Well nourished    Airway/Jaw/Neck:  Airway Findings: Mouth Opening: Normal Tongue: Normal  General Airway Assessment: Adult  Mallampati: II  Improves to II with phonation.  TM Distance: Normal, at least 6 cm  Jaw/Neck Findings:  Neck ROM: Normal ROM      Dental:  Dental Findings: In tact   Chest/Lungs:  Chest/Lungs Findings: Normal Respiratory Rate, Clear to auscultation     Heart/Vascular:  Heart Findings: Rate: Normal  Rhythm: Regular Rhythm        Mental Status:  Mental Status Findings:  Cooperative         Anesthesia Plan  Type of Anesthesia, risks & benefits discussed:  Anesthesia Type:  general  Patient's Preference: GA  Intra-op Monitoring Plan:   Intra-op Monitoring Plan Comments:   Post Op Pain Control Plan:   Post Op Pain Control Plan Comments:   Induction:   IV  Beta Blocker:  Patient is not currently on a Beta-Blocker (No further documentation required).       Informed Consent: Patient understands risks and agrees with Anesthesia plan.  Questions answered. Anesthesia consent signed with patient.  ASA Score: 2     Day of Surgery Review of History & Physical:  There are no significant changes.  H&P update referred to the surgeon.         Ready For Surgery From Anesthesia Perspective.

## 2017-03-06 NOTE — BRIEF OP NOTE
[facility]  Colon and Rectal Surgery  Operative Note    SUMMARY     Date of Procedure: 3/6/2017     Procedure: Procedure(s) (LRB):  REVISION-ILEOSTOMY, complicated, revise/repair continent ileostomy (N/A)     Surgeon(s) and Role:     * Mateusz Riddle MD - Primary     * Solitario Odonnell MD - Resident - Assisting        Pre-Operative Diagnosis: Ileostomy dysfunction [K94.13]    Post-Operative Diagnosis: Post-Op Diagnosis Codes:     * Ileostomy dysfunction [K94.13]    Anesthesia: General, Exparel 266 mg, Marcaine 0.25% (75 mg) saline 10 cc preperironeal injection      Technical Procedures Used:  Restapling valve and resiting pouch  Description of the Findings of the Procedure:slipped valve    Significant Surgical Tasks Conducted by the Assistant(s), if Applicable: n/a    Complications: No    Estimated Blood Loss (EBL): * No values recorded between 3/6/2017  7:27 AM and 3/6/2017  9:16 AM *           Implants: * No implants in log *    Specimens:   Specimen     None           Condition: Good    Disposition: PACU - hemodynamically stable.    Attestation: I was present and scrubbed for the entire procedure.

## 2017-03-06 NOTE — PROGRESS NOTES
Paged Dr. Riddle's resident to see if T&S is needed.  Blood is drawn, awaiting order if needed.  Awaiting call back.

## 2017-03-06 NOTE — PLAN OF CARE
Problem: Patient Care Overview  Goal: Plan of Care Review  Outcome: Ongoing (interventions implemented as appropriate)  Patient and family verbalize understanding of plan of care. Patient reports pain managed well with Dilaudid PCA as ordered. Patient tolerating clear liquid diet with no c/o N/V. No falls or acute events during shift. Family at bedside.

## 2017-03-06 NOTE — TRANSFER OF CARE
"Anesthesia Transfer of Care Note    Patient: Marco Valadez Jr.    Procedure(s) Performed: Procedure(s) (LRB):  REVISION-ILEOSTOMY, complicated, revise/repair continent ileostomy (N/A)    Patient location: PACU    Anesthesia Type: general    Transport from OR: Transported from OR on 6-10 L/min O2 by face mask with adequate spontaneous ventilation    Post pain: adequate analgesia    Post assessment: no apparent anesthetic complications    Post vital signs: stable    Level of consciousness: awake    Nausea/Vomiting: no nausea/vomiting    Complications: none          Last vitals:   Visit Vitals    BP (!) 158/89    Pulse 100    Temp 36.2 °C (97.1 °F) (Axillary)    Resp 11    Ht 5' 4" (1.626 m)    Wt 49.9 kg (110 lb)    SpO2 100%    BMI 18.88 kg/m2     "

## 2017-03-06 NOTE — IP AVS SNAPSHOT
[facility]  [address]  [facility] [address]  Phone: [phone]           Patient Discharge Instructions     Our goal is to set you up for success. This packet includes information on your condition, medications, and your home care. It will help you to care for yourself so you don't get sicker and need to go back to the hospital.     Please ask your nurse if you have any questions.        There are many details to remember when preparing to leave the hospital. Here is what you will need to do:    1. Take your medicine. If you are prescribed medications, review your Medication List in the following pages. You may have new medications to  at the pharmacy and others that you'll need to stop taking. Review the instructions for how and when to take your medications. Talk with your doctor or nurses if you are unsure of what to do.     2. Go to your follow-up appointments. Specific follow-up information is listed in the following pages. Your may be contacted by a transition nurse or clinical provider about future appointments. Be sure we have all of the phone numbers to reach you, if needed. Please contact your provider's office if you are unable to make an appointment.     3. Watch for warning signs. Your doctor or nurse will give you detailed warning signs to watch for and when to call for assistance. These instructions may also include educational information about your condition. If you experience any of warning signs to your health, call your doctor.               [facility]  Unless otherwise directed by your provider, please contact [facility], our nurse care line that is available for 24/7 assistance.     [phone] (toll-free)    Registered nurses in the [facility] provide clinical advisement, health education, appointment booking, and other advisory services.                    ** Verify the list of medication(s) below is accurate and up  to date. Carry this with you in case of emergency. If your medications have changed, please notify your healthcare provider.             Medication List      START taking these medications        Additional Info                      oxycodone-acetaminophen 7.5-325 mg per tablet   Commonly known as:  PERCOCET   Quantity:  50 tablet   Refills:  0   Dose:  1 tablet    Instructions:  Take 1 tablet by mouth every 4 (four) hours as needed.     Begin Date    AM    Noon    PM    Bedtime         CONTINUE taking these medications        Additional Info                      dicyclomine 20 mg tablet   Commonly known as:  BENTYL   Quantity:  120 tablet   Refills:  0   Dose:  20 mg    Last time this was given:  20 mg on 3/9/2017  6:01 AM   Instructions:  Take 1 tablet (20 mg total) by mouth every 6 (six) hours.     Begin Date    AM    Noon    PM    Bedtime       hydrocodone-acetaminophen 10-325mg  mg Tab   Commonly known as:  NORCO   Quantity:  60 tablet   Refills:  0   Dose:  1 tablet    Instructions:  Take 1 tablet by mouth every 4 (four) hours as needed for Pain.     Begin Date    AM    Noon    PM    Bedtime       Lactobacillus rhamnosus GG 10 billion cell capsule   Commonly known as:  CULTURELLE   Refills:  0   Dose:  1 capsule    Last time this was given:  1 capsule on 3/9/2017  8:54 AM   Instructions:  Take 1 capsule by mouth once daily.     Begin Date    AM    Noon    PM    Bedtime       polyethylene glycol 17 gram Pwpk   Commonly known as:  GLYCOLAX   Refills:  0    Last time this was given:  17 g on 3/9/2017  8:53 AM   Instructions:  Take by mouth.     Begin Date    AM    Noon    PM    Bedtime       valsartan 320 MG tablet   Commonly known as:  DIOVAN   Quantity:  30 tablet   Refills:  0   Dose:  320 mg    Last time this was given:  160 mg on 3/8/2017  8:50 AM   Instructions:  Take 1 tablet (320 mg total) by mouth once daily.     Begin Date    AM    Noon    PM    Bedtime            Where to Get Your Medications       You can get these medications from any pharmacy     Bring a paper prescription for each of these medications     oxycodone-acetaminophen 7.5-325 mg per tablet                  Please bring to all follow up appointments:    1. A copy of your discharge instructions.  2. All medicines you are currently taking in their original bottles.  3. Identification and insurance card.    Please arrive 15 minutes ahead of scheduled appointment time.    Please call 24 hours in advance if you must reschedule your appointment and/or time.        Your Scheduled Appointments     Mar 22, 2017  1:30 PM CDT   Post OP with Mateusz Riddle MD   [facility]-Colon and Rectal [facility]urg ([facility] )    [address]  [place] [id number] [phone]              Follow-up Information     Follow up with Mateusz Riddle MD In 2 weeks.    Specialty:  Colon and Rectal Surgery    Contact information:    [address] [facility] [id number] [phone]          Discharge Instructions     Future Orders    Call MD for:  persistent nausea and vomiting or diarrhea     Call MD for:  redness, tenderness, or signs of infection (pain, swelling, redness, odor or green/yellow discharge around incision site)     Call MD for:  severe uncontrolled pain     Call MD for:  temperature >100.4     Diet general     Questions:    Total calories:      Fat restriction, if any:      Protein restriction, if any:      Na restriction, if any:      Fluid restriction:      Additional restrictions:      Lifting restrictions         Primary Diagnosis     Your primary diagnosis was:  Mechanical Complication Of Colostomy Or Enterostomy      Admission Information     Date & Time Provider Department [facility]    3/6/2017  5:33 AM Mateusz Riddle MD [facility] [id number]      Care Providers     Provider Role Specialty Primary office phone    Mateusz Riddle MD Attending Provider Colon and Rectal Surgery [phone]    Mateusz Riddle MD Surgeon  Colon and  "Rectal Surgery [phone]      Your Vitals Were     BP Pulse Temp Resp Height Weight    127/77 77 98.1 °F (36.7 °C) (Oral) 16 5' 4" (1.626 m) 49.9 kg (110 lb)    SpO2 BMI             99% 18.88 kg/m2         Recent Lab Values     No lab values to display.      Allergies as of 3/9/2017        Reactions    Penicillins Other (See Comments)    Shakes uncontrollably      Advance Directives     An advance directive is a document which, in the event you are no longer able to make decisions for yourself, tells your healthcare team what kind of treatment you do or do not want to receive, or who you would like to make those decisions for you.  If you do not currently have an advance directive, [facility] encourages you to create one.  For more information call:  [phone]), [phone] ([phone]),  or log on to [email or web address].        Language Assistance Services     ATTENTION: Language assistance services are available, free of charge. Please call [phone].      ATENCIÓN: Si habla español, tiene a beckman disposición servicios gratuitos de asistencia lingüística. Llame al [phone].     ALAN Ý: N?u b?n nói Ti?ng Vi?t, có các d?ch v? h? tr? ngôn ng? mi?n phí dành cho b?n. G?i s? [phone].         [facility] complies with applicable Federal civil rights laws and does not discriminate on the basis of race, color, national origin, age, disability, or sex.        "

## 2017-03-06 NOTE — TRANSFER OF CARE
"Anesthesia Transfer of Care Note    Patient: Marco Valadez Jr.    Procedure(s) Performed: Procedure(s) (LRB):  REVISION-ILEOSTOMY, complicated, revise/repair continent ileostomy (N/A)    Anesthesia PACU Handoff    Last vitals:   Visit Vitals    BP (!) 148/85    Pulse 92    Temp 36.2 °C (97.1 °F) (Axillary)    Resp 14    Ht 5' 4" (1.626 m)    Wt 49.9 kg (110 lb)    SpO2 100%    BMI 18.88 kg/m2     "

## 2017-03-06 NOTE — INTERVAL H&P NOTE
The patient has been examined and the H&P has been reviewed:    I concur with the findings and no changes have occurred since H&P was written.    Anesthesia/Surgery risks, benefits and alternative options discussed and understood by patient/family.          Active Hospital Problems    Diagnosis  POA    Ileostomy dysfunction [K94.13]  Yes      Resolved Hospital Problems    Diagnosis Date Resolved POA   No resolved problems to display.

## 2017-03-06 NOTE — NURSING TRANSFER
Nursing Transfer Note      3/6/2017     Transfer To: 643A    Transfer via stretcher    Transfer with iv pole, pca    Transported by pct    Medicines sent: iv fluids, dilaudid pca    Chart send with patient: Yes    Notified: spouse    Patient reassessed at: 3/6/17

## 2017-03-06 NOTE — ANESTHESIA RELEASE NOTE
"Anesthesia Release from PACU Note    Patient: Marco Valadez Jr.    Procedure(s) Performed: Procedure(s) (LRB):  REVISION-ILEOSTOMY, complicated, revise/repair continent ileostomy (N/A)    Anesthesia type: general    Post pain: Adequate analgesia    Post assessment: no apparent anesthetic complications, tolerated procedure well and no evidence of recall    Last Vitals:   Visit Vitals    BP (!) 143/83    Pulse 93    Temp 36.3 °C (97.3 °F) (Temporal)    Resp 13    Ht 5' 4" (1.626 m)    Wt 49.9 kg (110 lb)    SpO2 100%    BMI 18.88 kg/m2       Post vital signs: stable    Level of consciousness: awake, alert  and oriented    Nausea/Vomiting: no nausea/no vomiting    Complications: none    Airway Patency: patent    Respiratory: unassisted, spontaneous ventilation, room air    Cardiovascular: stable and blood pressure at baseline    Hydration: euvolemic  "

## 2017-03-06 NOTE — ANESTHESIA POSTPROCEDURE EVALUATION
"Anesthesia Post Evaluation    Patient: Marco Valadez Jr.    Procedure(s) Performed: Procedure(s) (LRB):  REVISION-ILEOSTOMY, complicated, revise/repair continent ileostomy (N/A)    Final Anesthesia Type: general  Patient location during evaluation: PACU  Patient participation: Yes- Able to Participate  Level of consciousness: awake and alert and oriented  Post-procedure vital signs: reviewed and stable  Pain management: adequate  Airway patency: patent  PONV status at discharge: No PONV  Anesthetic complications: no      Cardiovascular status: blood pressure returned to baseline  Respiratory status: unassisted  Hydration status: euvolemic  Follow-up not needed.        Visit Vitals    BP (!) 143/83    Pulse 93    Temp 36.3 °C (97.3 °F) (Temporal)    Resp 13    Ht 5' 4" (1.626 m)    Wt 49.9 kg (110 lb)    SpO2 100%    BMI 18.88 kg/m2       Pain/Jyoti Score: Pain Assessment Performed: Yes (3/6/2017 10:51 AM)  Presence of Pain: complains of pain/discomfort (3/6/2017 10:51 AM)  Pain Rating Prior to Med Admin: 7 (3/6/2017 10:57 AM)  Jyoti Score: 10 (3/6/2017 10:51 AM)      "

## 2017-03-07 LAB
ANION GAP SERPL CALC-SCNC: 3 MMOL/L
ANION GAP SERPL CALC-SCNC: 4 MMOL/L
ANION GAP SERPL CALC-SCNC: 5 MMOL/L
ANISOCYTOSIS BLD QL SMEAR: SLIGHT
ANISOCYTOSIS BLD QL SMEAR: SLIGHT
BASO STIPL BLD QL SMEAR: ABNORMAL
BASOPHILS # BLD AUTO: ABNORMAL K/UL
BASOPHILS NFR BLD: 0 %
BASOPHILS NFR BLD: 0 %
BUN SERPL-MCNC: 29 MG/DL
BUN SERPL-MCNC: 42 MG/DL
BUN SERPL-MCNC: 48 MG/DL
CALCIUM SERPL-MCNC: 7.1 MG/DL
CALCIUM SERPL-MCNC: 7.3 MG/DL
CALCIUM SERPL-MCNC: 7.9 MG/DL
CHLORIDE SERPL-SCNC: 107 MMOL/L
CO2 SERPL-SCNC: 22 MMOL/L
CO2 SERPL-SCNC: 22 MMOL/L
CO2 SERPL-SCNC: 23 MMOL/L
CREAT SERPL-MCNC: 1 MG/DL
CREAT SERPL-MCNC: 1.2 MG/DL
CREAT SERPL-MCNC: 1.5 MG/DL
DIFFERENTIAL METHOD: ABNORMAL
DIFFERENTIAL METHOD: ABNORMAL
EOSINOPHIL # BLD AUTO: ABNORMAL K/UL
EOSINOPHIL NFR BLD: 0 %
EOSINOPHIL NFR BLD: 1.5 %
ERYTHROCYTE [DISTWIDTH] IN BLOOD BY AUTOMATED COUNT: 15.7 %
ERYTHROCYTE [DISTWIDTH] IN BLOOD BY AUTOMATED COUNT: 15.8 %
EST. GFR  (AFRICAN AMERICAN): >60 ML/MIN/1.73 M^2
EST. GFR  (NON AFRICAN AMERICAN): 56.2 ML/MIN/1.73 M^2
EST. GFR  (NON AFRICAN AMERICAN): >60 ML/MIN/1.73 M^2
EST. GFR  (NON AFRICAN AMERICAN): >60 ML/MIN/1.73 M^2
GLUCOSE SERPL-MCNC: 80 MG/DL
GLUCOSE SERPL-MCNC: 96 MG/DL
GLUCOSE SERPL-MCNC: 99 MG/DL
HCT VFR BLD AUTO: 28.2 %
HCT VFR BLD AUTO: 30.8 %
HGB BLD-MCNC: 9 G/DL
HGB BLD-MCNC: 9.9 G/DL
LACTATE SERPL-SCNC: 1.1 MMOL/L
LYMPHOCYTES # BLD AUTO: ABNORMAL K/UL
LYMPHOCYTES NFR BLD: 6 %
LYMPHOCYTES NFR BLD: 8 %
MAGNESIUM SERPL-MCNC: 2.1 MG/DL
MAGNESIUM SERPL-MCNC: 2.1 MG/DL
MCH RBC QN AUTO: 27.5 PG
MCH RBC QN AUTO: 27.9 PG
MCHC RBC AUTO-ENTMCNC: 31.9 %
MCHC RBC AUTO-ENTMCNC: 32.1 %
MCV RBC AUTO: 86 FL
MCV RBC AUTO: 87 FL
MONOCYTES # BLD AUTO: ABNORMAL K/UL
MONOCYTES NFR BLD: 6 %
MONOCYTES NFR BLD: 6 %
MYELOCYTES NFR BLD MANUAL: 1 %
NEUTROPHILS NFR BLD: 64 %
NEUTROPHILS NFR BLD: 72 %
NEUTS BAND NFR BLD MANUAL: 14.5 %
NEUTS BAND NFR BLD MANUAL: 21 %
OVALOCYTES BLD QL SMEAR: ABNORMAL
PHOSPHATE SERPL-MCNC: 2.9 MG/DL
PHOSPHATE SERPL-MCNC: 3.4 MG/DL
PLATELET # BLD AUTO: 201 K/UL
PLATELET # BLD AUTO: 217 K/UL
PLATELET BLD QL SMEAR: ABNORMAL
PLATELET BLD QL SMEAR: ABNORMAL
PMV BLD AUTO: 9.2 FL
PMV BLD AUTO: 9.5 FL
POIKILOCYTOSIS BLD QL SMEAR: SLIGHT
POLYCHROMASIA BLD QL SMEAR: ABNORMAL
POLYCHROMASIA BLD QL SMEAR: ABNORMAL
POTASSIUM SERPL-SCNC: 4.7 MMOL/L
POTASSIUM SERPL-SCNC: 5.1 MMOL/L
POTASSIUM SERPL-SCNC: 5.3 MMOL/L
RBC # BLD AUTO: 3.27 M/UL
RBC # BLD AUTO: 3.55 M/UL
SODIUM SERPL-SCNC: 132 MMOL/L
SODIUM SERPL-SCNC: 133 MMOL/L
SODIUM SERPL-SCNC: 135 MMOL/L
WBC # BLD AUTO: 9.61 K/UL
WBC # BLD AUTO: 9.89 K/UL

## 2017-03-07 PROCEDURE — 83735 ASSAY OF MAGNESIUM: CPT | Mod: 91

## 2017-03-07 PROCEDURE — 63600175 PHARM REV CODE 636 W HCPCS: Performed by: STUDENT IN AN ORGANIZED HEALTH CARE EDUCATION/TRAINING PROGRAM

## 2017-03-07 PROCEDURE — 84100 ASSAY OF PHOSPHORUS: CPT | Mod: 91

## 2017-03-07 PROCEDURE — 80048 BASIC METABOLIC PNL TOTAL CA: CPT | Mod: 91

## 2017-03-07 PROCEDURE — 63600175 PHARM REV CODE 636 W HCPCS: Performed by: SURGERY

## 2017-03-07 PROCEDURE — 83735 ASSAY OF MAGNESIUM: CPT

## 2017-03-07 PROCEDURE — 36415 COLL VENOUS BLD VENIPUNCTURE: CPT

## 2017-03-07 PROCEDURE — 25000003 PHARM REV CODE 250: Performed by: STUDENT IN AN ORGANIZED HEALTH CARE EDUCATION/TRAINING PROGRAM

## 2017-03-07 PROCEDURE — 25000003 PHARM REV CODE 250: Performed by: SURGERY

## 2017-03-07 PROCEDURE — 20600001 HC STEP DOWN PRIVATE ROOM

## 2017-03-07 PROCEDURE — 85007 BL SMEAR W/DIFF WBC COUNT: CPT | Mod: 91

## 2017-03-07 PROCEDURE — 85027 COMPLETE CBC AUTOMATED: CPT | Mod: 91

## 2017-03-07 PROCEDURE — 93005 ELECTROCARDIOGRAM TRACING: CPT

## 2017-03-07 PROCEDURE — 83605 ASSAY OF LACTIC ACID: CPT

## 2017-03-07 PROCEDURE — 84100 ASSAY OF PHOSPHORUS: CPT

## 2017-03-07 PROCEDURE — 80048 BASIC METABOLIC PNL TOTAL CA: CPT

## 2017-03-07 PROCEDURE — 93010 ELECTROCARDIOGRAM REPORT: CPT | Mod: ,,, | Performed by: INTERNAL MEDICINE

## 2017-03-07 RX ORDER — OXYCODONE AND ACETAMINOPHEN 10; 325 MG/1; MG/1
1 TABLET ORAL EVERY 4 HOURS PRN
Status: DISCONTINUED | OUTPATIENT
Start: 2017-03-07 | End: 2017-03-09 | Stop reason: HOSPADM

## 2017-03-07 RX ORDER — OXYCODONE AND ACETAMINOPHEN 5; 325 MG/1; MG/1
1 TABLET ORAL EVERY 4 HOURS PRN
Status: DISCONTINUED | OUTPATIENT
Start: 2017-03-07 | End: 2017-03-08

## 2017-03-07 RX ORDER — HYDROMORPHONE HYDROCHLORIDE 1 MG/ML
0.5 INJECTION, SOLUTION INTRAMUSCULAR; INTRAVENOUS; SUBCUTANEOUS
Status: DISCONTINUED | OUTPATIENT
Start: 2017-03-07 | End: 2017-03-09 | Stop reason: HOSPADM

## 2017-03-07 RX ADMIN — SODIUM CHLORIDE: 0.9 INJECTION, SOLUTION INTRAVENOUS at 11:03

## 2017-03-07 RX ADMIN — POLYETHYLENE GLYCOL 3350 17 G: 17 POWDER, FOR SOLUTION ORAL at 09:03

## 2017-03-07 RX ADMIN — ACETAMINOPHEN 650 MG: 325 TABLET ORAL at 06:03

## 2017-03-07 RX ADMIN — OXYCODONE HYDROCHLORIDE AND ACETAMINOPHEN 1 TABLET: 10; 325 TABLET ORAL at 11:03

## 2017-03-07 RX ADMIN — ACETAMINOPHEN 650 MG: 325 TABLET ORAL at 01:03

## 2017-03-07 RX ADMIN — ONDANSETRON 4 MG: 2 INJECTION INTRAMUSCULAR; INTRAVENOUS at 03:03

## 2017-03-07 RX ADMIN — HYDROMORPHONE HYDROCHLORIDE 0.5 MG: 1 INJECTION, SOLUTION INTRAMUSCULAR; INTRAVENOUS; SUBCUTANEOUS at 10:03

## 2017-03-07 RX ADMIN — SODIUM CHLORIDE: 0.9 INJECTION, SOLUTION INTRAVENOUS at 06:03

## 2017-03-07 RX ADMIN — Medication 1 CAPSULE: at 09:03

## 2017-03-07 RX ADMIN — ENOXAPARIN SODIUM 40 MG: 100 INJECTION SUBCUTANEOUS at 09:03

## 2017-03-07 RX ADMIN — CALCIUM GLUCONATE 1000 MG: 94 INJECTION, SOLUTION INTRAVENOUS at 06:03

## 2017-03-07 RX ADMIN — DICYCLOMINE HYDROCHLORIDE 20 MG: 20 TABLET ORAL at 06:03

## 2017-03-07 RX ADMIN — OXYCODONE HYDROCHLORIDE AND ACETAMINOPHEN 1 TABLET: 10; 325 TABLET ORAL at 08:03

## 2017-03-07 RX ADMIN — HYDROMORPHONE HYDROCHLORIDE 0.5 MG: 1 INJECTION, SOLUTION INTRAMUSCULAR; INTRAVENOUS; SUBCUTANEOUS at 06:03

## 2017-03-07 RX ADMIN — DICYCLOMINE HYDROCHLORIDE 20 MG: 20 TABLET ORAL at 12:03

## 2017-03-07 RX ADMIN — HYDROMORPHONE HYDROCHLORIDE 0.5 MG: 1 INJECTION, SOLUTION INTRAMUSCULAR; INTRAVENOUS; SUBCUTANEOUS at 12:03

## 2017-03-07 NOTE — PLAN OF CARE
Pt is postop day #1, AA&Ox4, resting in bed, pt's spouse at bedside.  Pt receiving IVF's and PCA.  Muro tube to gravity drainage.   obtained assessment information from patient.  Educated pt on goals of the day, pt voiced understanding.  Anticipated discharge plan is for home with family support.  CM will continue to follow.    Pt's PCP is Dr Snow in [place], [place]    Pharmacy [facility] - [place], [place] - [address]  [address] [place] [address]  Phone: [phone] Fax: [phone]    Payor - Payor: MEDICAID / Plan: MEDICAID OF [place] / Product Type: Government /    Pt also reports he now has Medicare, CM e-mailed admitting request with this information.       03/07/17 0905   Discharge Assessment   Assessment Type Discharge Planning Assessment   Confirmed/corrected address and phone number on facesheet? Yes   Assessment information obtained from? Patient;Caregiver;Medical Record   Expected Length of Stay (days) 4   Prior to hospitilization cognitive status: Alert/Oriented   Prior to hospitalization functional status: Independent   Current cognitive status: Alert/Oriented   Current Functional Status: Needs Assistance   Arrived From home or self-care   Lives With spouse   Able to Return to Prior Arrangements yes   Is patient able to care for self after discharge? Yes   How many people do you have in your home that can help with your care after discharge? 1   Who are your caregiver(s) and their phone number(s)? Spouse, Kimberlee Valadez,  [phone]   Patient's perception of discharge disposition home or selfcare   Readmission Within The Last 30 Days no previous admission in last 30 days   Patient currently receives home health services? No   Does the patient currently use HME? Yes   Equipment Currently Used at Home colostomy/ostomy supplies;walker, rolling   Do you have any problems affording any of your prescribed medications? No   Is the patient taking medications as  prescribed? yes   Do you have any financial concerns preventing you from receiving the healthcare you need? No   Does the patient have transportation to healthcare appointments? Yes   Transportation Available family or friend will provide   On Dialysis? No   Does the patient receive services at the Coumadin Clinic? No   Discharge Plan A Home with family   Discharge Plan B Home Health   Patient/Family In Agreement With Plan yes

## 2017-03-07 NOTE — PLAN OF CARE
Sw following for d/c needs.  No d/c needs identified at this time. Sw will continue to follow.      Kate Tovar, ARMANDO [id number]

## 2017-03-07 NOTE — PROGRESS NOTES
Pt received large dose of valsartan at 1pm. Feels perfectly fine, looks perfectly fine.  Abd soft, ND, appropriately tender, tube in place in ostomy  RRR  Lungs CTAB    Not responsive to IVF      Likely hypotensive 2/2 bp meds, asymptomatic  3L given  Check labs, EKG  PCA decreased  Continue to monitor    Laure Santoro PGY3  [phone]

## 2017-03-07 NOTE — PROGRESS NOTES
GENERAL SURGERY  Progress Note    Hospital Day: 2  Admit Date: 3/6/2017    Date of Surgery:  3/6/2017  Post-Operative Day #:  1 Day Post-Op    Procedure:  Procedure(s):  REVISION-ILEOSTOMY, complicated, revise/repair continent ileostomy (N/A)    SUBJECTIVE:     Patient hypotensive overnight.  Not responsive to fluid boluses.  Remained asymptomatic, likely due to receiving home BP meds post op.  Continued to have good UOP.  Pain controled with PCA     Current Medications:   acetaminophen  650 mg Oral Q4H    dicyclomine  20 mg Oral Q6H    enoxparin  40 mg Subcutaneous Q24H    Lactobacillus rhamnosus GG  1 capsule Oral Daily    polyethylene glycol  17 g Oral Daily     Infusions:   sodium chloride 0.9% 125 mL/hr at 03/07/17 0107    hydromorphone in 0.9 % NaCl 6 mg/30 ml       PRN:diphenhydrAMINE, naloxone, ondansetron, promethazine (PHENERGAN) IVPB    Review of patient's allergies indicates:   Allergen Reactions    Penicillins Other (See Comments)     Shakes uncontrollably       OBJECTIVE:     Most Recent Vitals:  Temp: 98.1 °F (36.7 °C) (03/07/17 0443)  Pulse: 92 (03/07/17 0443)  Resp: 18 (03/07/17 0443)  BP: (!) 82/52 (03/07/17 0443)  SpO2: 96 % (03/07/17 0443)    24-Hour Vitals Range:  Temp:  [97.1 °F (36.2 °C)-98.5 °F (36.9 °C)]   Pulse:  []   Resp:  [10-18]   BP: ()/()   SpO2:  [95 %-100 %]     24-Hour I&O:  Intake/Output Summary (Last 24 hours) at 03/07/17 0744  Last data filed at 03/07/17 0500   Gross per 24 hour   Intake          5002.09 ml   Output             1775 ml   Net          3227.09 ml       PHYSICAL EXAM:  NAD  RRR  CTAB  Abd soft, ND, appropriately tender, tube in place in ostomy    LAB RESULTS:    Recent Labs  Lab 03/07/17 0036   WBC 9.61   HGB 9.9*   HCT 30.8*      BAND 14.5     Recent Labs  Lab 03/07/17 0036 03/07/17  0119   *  --    K 5.3*  --      --    CO2 22*  --    BUN 48*  --    CREATININE 1.5*  --    GLU 99  --    CALCIUM 7.1*  --    MG  --  2.1    PHOS  --  3.4     Recent Labs  Lab 03/07/17  0119   LACTATE 1.1       ASSESSMENT:     Marco Valadez Jr. is a 41 y.o. male who is now 1 Day Post-Op s/p Procedure(s):  REVISION-ILEOSTOMY, complicated, revise/repair continent ileostomy (N/A)    PLAN:  · GI soft diet  · D/C PCA.  PO pain meds with IV breakthrough   · Continue IVF  · D/C Harper catheter   · JAYDEN - will recheck afternoon BMP  · Trend daily labs.  · OOB/ambulate.  · Prophylaxis: DVT       Manolo Jessica MD

## 2017-03-07 NOTE — PROGRESS NOTES
MD made aware of pt BP 82/56 manually. HR 96. Pt drowsy but able to wake up and talk to RN. Pt says he feels fine, just sleepy.  1L NS bolus ordered. Will continue to monitor.

## 2017-03-07 NOTE — PROGRESS NOTES
MD called to patient's bedside numerous times over the past 6 hours for hypotension. Patient's BP has been running in the 70s - 80s systolic with HR in the 90s-100s. Initially a 1 L bolus of normal saline was given with little resolution in BP. A short time later, a second 1 L bolus was given and the patient's PCA pump was reduced. He does not complain of pain.The patient has been alert and oriented throughout the evening and night. He has been making adequate urine, ileostomy output is appropriate and he has been tolerating his diet. His belly is soft. Seeing as the patient's BP has not responded appropriately, labs and an EKG were ordered. EKG showed sinus tachycardia. Labs still pending. Will continue to monitor.       Wyatt Brown MD  General Surgery, PGY1

## 2017-03-07 NOTE — PLAN OF CARE
Problem: Patient Care Overview  Goal: Plan of Care Review  Outcome: Ongoing (interventions implemented as appropriate)  POC reviewed with pt who verbalized understanding. AAOx4. 1L Bolus given for low BP; VSS otherwise.  Remains free of falls and injury. Right ileostomy in place to horner bag; flushed with 20cc Q4. Horner in place. Tolerating CL diet. Pain managed with Dilaudid PCA.  No distress noted. TEDs and SCDs in place. Resting between care. Wife at bedside. Will continue to monitor.

## 2017-03-08 LAB
ANION GAP SERPL CALC-SCNC: 3 MMOL/L
BASOPHILS # BLD AUTO: 0.01 K/UL
BASOPHILS NFR BLD: 0.1 %
BUN SERPL-MCNC: 16 MG/DL
CALCIUM SERPL-MCNC: 8.1 MG/DL
CHLORIDE SERPL-SCNC: 107 MMOL/L
CO2 SERPL-SCNC: 25 MMOL/L
CREAT SERPL-MCNC: 0.9 MG/DL
DIFFERENTIAL METHOD: ABNORMAL
EOSINOPHIL # BLD AUTO: 0.2 K/UL
EOSINOPHIL NFR BLD: 2.4 %
ERYTHROCYTE [DISTWIDTH] IN BLOOD BY AUTOMATED COUNT: 15.9 %
EST. GFR  (AFRICAN AMERICAN): >60 ML/MIN/1.73 M^2
EST. GFR  (NON AFRICAN AMERICAN): >60 ML/MIN/1.73 M^2
GLUCOSE SERPL-MCNC: 93 MG/DL
HCT VFR BLD AUTO: 28.4 %
HGB BLD-MCNC: 9.2 G/DL
LYMPHOCYTES # BLD AUTO: 0.8 K/UL
LYMPHOCYTES NFR BLD: 8.2 %
MAGNESIUM SERPL-MCNC: 2 MG/DL
MCH RBC QN AUTO: 27.6 PG
MCHC RBC AUTO-ENTMCNC: 32.4 %
MCV RBC AUTO: 85 FL
MONOCYTES # BLD AUTO: 0.9 K/UL
MONOCYTES NFR BLD: 8.8 %
NEUTROPHILS # BLD AUTO: 8.2 K/UL
NEUTROPHILS NFR BLD: 80.2 %
PHOSPHATE SERPL-MCNC: 1.9 MG/DL
PLATELET # BLD AUTO: 230 K/UL
PMV BLD AUTO: 9.3 FL
POTASSIUM SERPL-SCNC: 4.5 MMOL/L
RBC # BLD AUTO: 3.33 M/UL
SODIUM SERPL-SCNC: 135 MMOL/L
WBC # BLD AUTO: 10.17 K/UL

## 2017-03-08 PROCEDURE — 83735 ASSAY OF MAGNESIUM: CPT

## 2017-03-08 PROCEDURE — 25000003 PHARM REV CODE 250: Performed by: SURGERY

## 2017-03-08 PROCEDURE — 36415 COLL VENOUS BLD VENIPUNCTURE: CPT

## 2017-03-08 PROCEDURE — 80048 BASIC METABOLIC PNL TOTAL CA: CPT

## 2017-03-08 PROCEDURE — 63600175 PHARM REV CODE 636 W HCPCS: Performed by: SURGERY

## 2017-03-08 PROCEDURE — 25000003 PHARM REV CODE 250: Performed by: ORTHOPAEDIC SURGERY

## 2017-03-08 PROCEDURE — 84100 ASSAY OF PHOSPHORUS: CPT

## 2017-03-08 PROCEDURE — 63600175 PHARM REV CODE 636 W HCPCS: Performed by: STUDENT IN AN ORGANIZED HEALTH CARE EDUCATION/TRAINING PROGRAM

## 2017-03-08 PROCEDURE — 25000003 PHARM REV CODE 250: Performed by: STUDENT IN AN ORGANIZED HEALTH CARE EDUCATION/TRAINING PROGRAM

## 2017-03-08 PROCEDURE — 85025 COMPLETE CBC W/AUTO DIFF WBC: CPT

## 2017-03-08 PROCEDURE — 20600001 HC STEP DOWN PRIVATE ROOM

## 2017-03-08 RX ORDER — ACETAMINOPHEN 325 MG/1
650 TABLET ORAL EVERY 6 HOURS PRN
Status: DISCONTINUED | OUTPATIENT
Start: 2017-03-08 | End: 2017-03-09 | Stop reason: HOSPADM

## 2017-03-08 RX ORDER — OXYCODONE AND ACETAMINOPHEN 7.5; 325 MG/1; MG/1
1 TABLET ORAL EVERY 4 HOURS PRN
Status: DISCONTINUED | OUTPATIENT
Start: 2017-03-08 | End: 2017-03-09 | Stop reason: HOSPADM

## 2017-03-08 RX ORDER — VALSARTAN 160 MG/1
160 TABLET ORAL DAILY
Status: DISCONTINUED | OUTPATIENT
Start: 2017-03-08 | End: 2017-03-09 | Stop reason: HOSPADM

## 2017-03-08 RX ORDER — OXYCODONE AND ACETAMINOPHEN 7.5; 325 MG/1; MG/1
1 TABLET ORAL EVERY 4 HOURS PRN
Qty: 50 TABLET | Refills: 0 | Status: SHIPPED | OUTPATIENT
Start: 2017-03-08 | End: 2017-03-20 | Stop reason: SDUPTHER

## 2017-03-08 RX ADMIN — DICYCLOMINE HYDROCHLORIDE 20 MG: 20 TABLET ORAL at 11:03

## 2017-03-08 RX ADMIN — HYDROMORPHONE HYDROCHLORIDE 0.5 MG: 1 INJECTION, SOLUTION INTRAMUSCULAR; INTRAVENOUS; SUBCUTANEOUS at 03:03

## 2017-03-08 RX ADMIN — HYDROMORPHONE HYDROCHLORIDE 0.5 MG: 1 INJECTION, SOLUTION INTRAMUSCULAR; INTRAVENOUS; SUBCUTANEOUS at 01:03

## 2017-03-08 RX ADMIN — OXYCODONE HYDROCHLORIDE AND ACETAMINOPHEN 1 TABLET: 10; 325 TABLET ORAL at 08:03

## 2017-03-08 RX ADMIN — POLYETHYLENE GLYCOL 3350 17 G: 17 POWDER, FOR SOLUTION ORAL at 08:03

## 2017-03-08 RX ADMIN — HYDROMORPHONE HYDROCHLORIDE 0.5 MG: 1 INJECTION, SOLUTION INTRAMUSCULAR; INTRAVENOUS; SUBCUTANEOUS at 07:03

## 2017-03-08 RX ADMIN — OXYCODONE HYDROCHLORIDE AND ACETAMINOPHEN 1 TABLET: 10; 325 TABLET ORAL at 12:03

## 2017-03-08 RX ADMIN — ENOXAPARIN SODIUM 40 MG: 100 INJECTION SUBCUTANEOUS at 08:03

## 2017-03-08 RX ADMIN — DICYCLOMINE HYDROCHLORIDE 20 MG: 20 TABLET ORAL at 06:03

## 2017-03-08 RX ADMIN — SODIUM PHOSPHATE, MONOBASIC, MONOHYDRATE 30 MMOL: 276; 142 INJECTION, SOLUTION INTRAVENOUS at 08:03

## 2017-03-08 RX ADMIN — OXYCODONE HYDROCHLORIDE AND ACETAMINOPHEN 1 TABLET: 10; 325 TABLET ORAL at 04:03

## 2017-03-08 RX ADMIN — VALSARTAN 160 MG: 160 TABLET, FILM COATED ORAL at 08:03

## 2017-03-08 RX ADMIN — OXYCODONE HYDROCHLORIDE AND ACETAMINOPHEN 1 TABLET: 10; 325 TABLET ORAL at 05:03

## 2017-03-08 RX ADMIN — OXYCODONE HYDROCHLORIDE AND ACETAMINOPHEN 1 TABLET: 10; 325 TABLET ORAL at 11:03

## 2017-03-08 RX ADMIN — DICYCLOMINE HYDROCHLORIDE 20 MG: 20 TABLET ORAL at 05:03

## 2017-03-08 RX ADMIN — Medication 1 CAPSULE: at 08:03

## 2017-03-08 RX ADMIN — DICYCLOMINE HYDROCHLORIDE 20 MG: 20 TABLET ORAL at 12:03

## 2017-03-08 RX ADMIN — ACETAMINOPHEN 650 MG: 325 TABLET ORAL at 03:03

## 2017-03-08 NOTE — PLAN OF CARE
Pt to d/c today with family support. No other Sw needs identified at this time.      Kate Tovar, ARMANDO [id number]

## 2017-03-08 NOTE — PROGRESS NOTES
GENERAL SURGERY  Progress Note    Hospital Day: 3  Admit Date: 3/6/2017    Date of Surgery:  3/6/2017  Post-Operative Day #:  2 Days Post-Op    Procedure:  Procedure(s):  REVISION-ILEOSTOMY, complicated, revise/repair continent ileostomy (N/A)    SUBJECTIVE:     JAYDEN resolved, good UOP. Baseline HTN. Pain controlled with oral meds.  Ambulating. Tolerating diet. No nausea or emesis.    Current Medications:   dicyclomine  20 mg Oral Q6H    enoxparin  40 mg Subcutaneous Q24H    Lactobacillus rhamnosus GG  1 capsule Oral Daily    polyethylene glycol  17 g Oral Daily    sodium phosphate IVPB  30 mmol Intravenous Once    valsartan  160 mg Oral Daily     Infusions:     PRN:acetaminophen, diphenhydrAMINE, HYDROmorphone, ondansetron, oxycodone-acetaminophen, oxycodone-acetaminophen, promethazine (PHENERGAN) IVPB    Review of patient's allergies indicates:   Allergen Reactions    Penicillins Other (See Comments)     Shakes uncontrollably       OBJECTIVE:     Most Recent Vitals:  Temp: 98.4 °F (36.9 °C) (03/08/17 0800)  Pulse: 95 (03/08/17 0800)  Resp: 20 (03/08/17 0800)  BP: (!) 137/49 (03/08/17 0800)  SpO2: 98 % (03/08/17 0800)    24-Hour Vitals Range:  Temp:  [97.6 °F (36.4 °C)-99.5 °F (37.5 °C)]   Pulse:  []   Resp:  [18-20]   BP: ()/(49-75)   SpO2:  [93 %-100 %]     24-Hour I&O:    Intake/Output Summary (Last 24 hours) at 03/08/17 0821  Last data filed at 03/08/17 0323   Gross per 24 hour   Intake          4139.92 ml   Output             2900 ml   Net          1239.92 ml       PHYSICAL EXAM:  NAD  RRR  CTAB  Abd soft, ND, appropriately tender, tube in place in ostomy with 900 cc liquid output    LAB RESULTS:    Recent Labs  Lab 03/07/17  0036 03/07/17  0545 03/08/17  0434   WBC 9.61 9.89 10.17   HGB 9.9* 9.0* 9.2*   HCT 30.8* 28.2* 28.4*    217 230   BAND 14.5 21.0  --    MYELOPCT  --  1.0  --        Recent Labs  Lab 03/07/17  0119 03/07/17  0545 03/07/17  1354 03/08/17  0434   NA  --  133* 135*  135*   K  --  5.1 4.7 4.5   CL  --  107 107 107   CO2  --  22* 23 25   BUN  --  42* 29* 16   CREATININE  --  1.2 1.0 0.9   GLU  --  80 96 93   CALCIUM  --  7.3* 7.9* 8.1*   MG 2.1 2.1  --  2.0   PHOS 3.4 2.9  --  1.9*       Recent Labs  Lab 03/07/17  0119   LACTATE 1.1       ASSESSMENT:     Marco Valadez  is a 41 y.o. male who is now 2 Days Post-Op s/p Procedure(s):  REVISION-ILEOSTOMY, complicated, revise/repair continent ileostomy (N/A)    PLAN:  · GI soft diet  · Increased PO pain meds  · Hep lock IV  · JAYDEN - resolved  · HTN - restart home meds  · OOB/ambulate.  · Prophylaxis: DVT   · DC this afternoon vs tomorrow      Solitario Odonnell MD

## 2017-03-08 NOTE — PLAN OF CARE
Problem: Patient Care Overview  Goal: Plan of Care Review  Outcome: Ongoing (interventions implemented as appropriate)  POC reviewed with pt who verbalized understanding. AAOx4. VSS. Remains free of falls and injury. Right Medena tube in place to horner bag; flushed with 20cc Q4. Voiding per urianl. Tolerating diet; no complaints of nausea. Pain managed with PRN meds per MAR. Room air. No distress noted. No acute events. TEDs and SCDs in place. Resting well between care. Wife at bedside. Will continue to monitor

## 2017-03-09 VITALS
OXYGEN SATURATION: 99 % | DIASTOLIC BLOOD PRESSURE: 77 MMHG | HEART RATE: 77 BPM | SYSTOLIC BLOOD PRESSURE: 127 MMHG | TEMPERATURE: 98 F | HEIGHT: 64 IN | WEIGHT: 110 LBS | BODY MASS INDEX: 18.78 KG/M2 | RESPIRATION RATE: 16 BRPM

## 2017-03-09 LAB
ANION GAP SERPL CALC-SCNC: 7 MMOL/L
BASOPHILS # BLD AUTO: 0.01 K/UL
BASOPHILS NFR BLD: 0.1 %
BUN SERPL-MCNC: 12 MG/DL
CALCIUM SERPL-MCNC: 8.4 MG/DL
CHLORIDE SERPL-SCNC: 103 MMOL/L
CO2 SERPL-SCNC: 27 MMOL/L
CREAT SERPL-MCNC: 0.9 MG/DL
DIFFERENTIAL METHOD: ABNORMAL
EOSINOPHIL # BLD AUTO: 0.3 K/UL
EOSINOPHIL NFR BLD: 3.1 %
ERYTHROCYTE [DISTWIDTH] IN BLOOD BY AUTOMATED COUNT: 16 %
EST. GFR  (AFRICAN AMERICAN): >60 ML/MIN/1.73 M^2
EST. GFR  (NON AFRICAN AMERICAN): >60 ML/MIN/1.73 M^2
GLUCOSE SERPL-MCNC: 95 MG/DL
HCT VFR BLD AUTO: 28.5 %
HGB BLD-MCNC: 9.3 G/DL
LYMPHOCYTES # BLD AUTO: 1.4 K/UL
LYMPHOCYTES NFR BLD: 15.6 %
MAGNESIUM SERPL-MCNC: 1.9 MG/DL
MCH RBC QN AUTO: 28.2 PG
MCHC RBC AUTO-ENTMCNC: 32.6 %
MCV RBC AUTO: 86 FL
MONOCYTES # BLD AUTO: 0.7 K/UL
MONOCYTES NFR BLD: 8.2 %
NEUTROPHILS # BLD AUTO: 6.3 K/UL
NEUTROPHILS NFR BLD: 72.8 %
PHOSPHATE SERPL-MCNC: 3.5 MG/DL
PLATELET # BLD AUTO: 209 K/UL
PMV BLD AUTO: 9.9 FL
POTASSIUM SERPL-SCNC: 4.3 MMOL/L
RBC # BLD AUTO: 3.3 M/UL
SODIUM SERPL-SCNC: 137 MMOL/L
WBC # BLD AUTO: 8.7 K/UL

## 2017-03-09 PROCEDURE — 25000003 PHARM REV CODE 250: Performed by: STUDENT IN AN ORGANIZED HEALTH CARE EDUCATION/TRAINING PROGRAM

## 2017-03-09 PROCEDURE — 25000003 PHARM REV CODE 250: Performed by: SURGERY

## 2017-03-09 PROCEDURE — 63600175 PHARM REV CODE 636 W HCPCS: Performed by: SURGERY

## 2017-03-09 PROCEDURE — 80048 BASIC METABOLIC PNL TOTAL CA: CPT

## 2017-03-09 PROCEDURE — 83735 ASSAY OF MAGNESIUM: CPT

## 2017-03-09 PROCEDURE — 85025 COMPLETE CBC W/AUTO DIFF WBC: CPT

## 2017-03-09 PROCEDURE — 36415 COLL VENOUS BLD VENIPUNCTURE: CPT

## 2017-03-09 PROCEDURE — 84100 ASSAY OF PHOSPHORUS: CPT

## 2017-03-09 RX ADMIN — DICYCLOMINE HYDROCHLORIDE 20 MG: 20 TABLET ORAL at 06:03

## 2017-03-09 RX ADMIN — OXYCODONE HYDROCHLORIDE AND ACETAMINOPHEN 1 TABLET: 10; 325 TABLET ORAL at 10:03

## 2017-03-09 RX ADMIN — Medication 1 CAPSULE: at 08:03

## 2017-03-09 RX ADMIN — POLYETHYLENE GLYCOL 3350 17 G: 17 POWDER, FOR SOLUTION ORAL at 08:03

## 2017-03-09 RX ADMIN — OXYCODONE HYDROCHLORIDE AND ACETAMINOPHEN 1 TABLET: 10; 325 TABLET ORAL at 06:03

## 2017-03-09 RX ADMIN — ENOXAPARIN SODIUM 40 MG: 100 INJECTION SUBCUTANEOUS at 08:03

## 2017-03-09 RX ADMIN — OXYCODONE HYDROCHLORIDE AND ACETAMINOPHEN 1 TABLET: 10; 325 TABLET ORAL at 01:03

## 2017-03-09 NOTE — NURSING
Education given to pt. Patient verbalized understanding. All questions answered. Peripheral IV removed with catheter intact. RX given to pt. Awaiting transport for discharge.  Will continue to monitor. Hortensia Leslie RN

## 2017-03-09 NOTE — PLAN OF CARE
Pt discharged home with support of wife.  No  care need or new HME.    Future Appointments  Date Time Provider Department Center   3/22/2017 1:30 PM Mateusz Riddle MD [facility]        03/09/17 8321   Final Note   Assessment Type Final Discharge Note   Discharge Disposition Home   Discharge planning education complete? Yes   Hospital Follow Up  Appt(s) scheduled? Yes   Discharge plans and expectations educations in teach back method with documentation complete? Yes   [facility]? n/a   Discharge/Hospital Encounter Summary to (non-[facility]) PCP n/a   Referral to Outpatient Case Management complete? n/a   Referral to / orders for Home Health Complete? n/a   30 day supply of medicines given at discharge, if documented non-compliance / non-adherence? n/a   Any social issues identified prior to discharge? No   Did you assess the readiness or willingness of the family or caregiver to support self management of care? Yes

## 2017-03-09 NOTE — PLAN OF CARE
Problem: Patient Care Overview  Goal: Plan of Care Review  Outcome: Ongoing (interventions implemented as appropriate)  POC reviewed with pt, understanding verbalized. AAOx4. VSS;afebrile. Room air, no SOB reported. Pt complaining of abdominal pain, adbomen firm. Pain controlled with prn meds. Denies nausea. Ambulathing without difficulty. Voiding per urinal, output adequate. Pt remains free of trauma, falls, and skin injuries. Bed in lowest position. Call bell w/i reach. WCTM.

## 2017-03-09 NOTE — PROGRESS NOTES
GENERAL SURGERY  Progress Note    Hospital Day: 4  Admit Date: 3/6/2017    Date of Surgery:  3/6/2017  Post-Operative Day #:  3 Days Post-Op    Procedure:  Procedure(s):  REVISION-ILEOSTOMY, complicated, revise/repair continent ileostomy (N/A)    SUBJECTIVE:     JAYDEN resolved, good UOP. HTN improved. Pain controlled with oral meds.  Ambulating. Tolerating diet. Some abdominal discomfort.    Current Medications:   dicyclomine  20 mg Oral Q6H    enoxparin  40 mg Subcutaneous Q24H    Lactobacillus rhamnosus GG  1 capsule Oral Daily    polyethylene glycol  17 g Oral Daily    valsartan  160 mg Oral Daily     Infusions:     PRN:acetaminophen, diphenhydrAMINE, HYDROmorphone, ondansetron, oxycodone-acetaminophen, oxycodone-acetaminophen, promethazine (PHENERGAN) IVPB    Review of patient's allergies indicates:   Allergen Reactions    Penicillins Other (See Comments)     Shakes uncontrollably       OBJECTIVE:     Most Recent Vitals:  Temp: 97.7 °F (36.5 °C) (03/09/17 0400)  Pulse: 86 (03/09/17 0400)  Resp: 15 (03/09/17 0400)  BP: 115/66 (03/09/17 0400)  SpO2: 99 % (03/09/17 0400)    24-Hour Vitals Range:  Temp:  [97.6 °F (36.4 °C)-99.5 °F (37.5 °C)]   Pulse:  [84-92]   Resp:  [15-20]   BP: (115-163)/()   SpO2:  [97 %-99 %]     24-Hour I&O:    Intake/Output Summary (Last 24 hours) at 03/09/17 0806  Last data filed at 03/08/17 2333   Gross per 24 hour   Intake             1540 ml   Output             1455 ml   Net               85 ml       PHYSICAL EXAM:  NAD  RRR  CTAB  Abd soft, ND, appropriately tender, tube in place in ostomy with 900 cc liquid output    LAB RESULTS:    Recent Labs  Lab 03/07/17  0036 03/07/17  0545 03/08/17  0434 03/09/17  0406   WBC 9.61 9.89 10.17 8.70   HGB 9.9* 9.0* 9.2* 9.3*   HCT 30.8* 28.2* 28.4* 28.5*    217 230 209   BAND 14.5 21.0  --   --    MYELOPCT  --  1.0  --   --        Recent Labs  Lab 03/07/17  0545 03/07/17  1354 03/08/17  0434 03/09/17  0406   * 135* 135* 137    K 5.1 4.7 4.5 4.3    107 107 103   CO2 22* 23 25 27   BUN 42* 29* 16 12   CREATININE 1.2 1.0 0.9 0.9   GLU 80 96 93 95   CALCIUM 7.3* 7.9* 8.1* 8.4*   MG 2.1  --  2.0 1.9   PHOS 2.9  --  1.9* 3.5       Recent Labs  Lab 03/07/17  0119   LACTATE 1.1       ASSESSMENT:     Marco Valadez  is a 41 y.o. male who is now 3 Days Post-Op s/p Procedure(s):  REVISION-ILEOSTOMY, complicated, revise/repair continent ileostomy (N/A)    PLAN:  · GI soft diet  · KUB  · PO pain meds  · JAYDEN - resolved  · HTN - restarted home meds, improved  · OOB/ambulate.  · Prophylaxis: DVT   · DC today      Solitario Odonnell MD

## 2017-03-10 NOTE — DISCHARGE SUMMARY
[facility]  DISCHARGE SUMMARY  General Surgery      Admit Date:  3/6/2017    Discharge Date and Time:  3/9/17    Attending Physician:  Mateusz Riddle MD    Discharge Provider:  Manolo Jessica MD     Reason for Admission:  Ileostomy dysfunction     Procedures Performed:  Procedure(s) (LRB):  REVISION-ILEOSTOMY, complicated, revise/repair continent ileostomy (N/A)    Hospital Course:  Please see the preoperative H&P and other available documentation for full details related to history prior to this admission.  Briefly, Marco Valadez Jr. is a 41 y.o. male who was admitted following scheduled elective surgery for Ileostomy dysfunction    Following a complete preoperative discussion of the risks and benefits of surgery with signed informed consent, the patient was taken to the operating room on 3/6/2017 and underwent the above stated procedures.  The patient tolerated surgery well and there were no complications.  Please see the operative report for full intraoperative findings and details.  Postoperatively, the patient did well and was transferred from the PACU to the floor in stable condition where they had a stable and uncomplicated hospital course.  Labs and vital signs remained stable and appropriate throughout course.  Diet was advanced as tolerated and the patient's pain was controlled on oral pain medications without problem.  Currently, the patient is doing well at 3 days post op and is stable and appropriate for discharge home at this time.      Consults:  None.    Significant Diagnostic Studies:     Recent Labs  Lab 03/08/17  0434 03/09/17  0406   WBC 10.17 8.70   HGB 9.2* 9.3*   HCT 28.4* 28.5*    209     Recent Labs  Lab 03/07/17  1354 03/08/17  0434 03/09/17  0406   * 135* 137   K 4.7 4.5 4.3    107 103   CO2 23 25 27   BUN 29* 16 12   CREATININE 1.0 0.9 0.9   GLU 96 93 95   CALCIUM 7.9* 8.1* 8.4*   MG  --  2.0 1.9   PHOS  --  1.9* 3.5   No results for input(s): INR, PTT,  LABHEPA, LACTATE, TROPONINI, CPK, CPKMB, MB, BNP in the last 72 hours.No results for input(s): PH, PCO2, PO2, HCO3 in the last 72 hours.      Final Diagnoses:   Principal Problem:  Ileostomy dysfunction   Secondary Diagnoses:    Active Hospital Problems    Diagnosis  POA    *Ileostomy dysfunction [K94.13]  Yes      Resolved Hospital Problems    Diagnosis Date Resolved POA   No resolved problems to display.       Discharged Condition:  Good    Disposition:  Home or Self Care    Follow Up/Patient Instructions:     Medications:  Reconciled Home Medications:  Discharge Medication List as of 3/9/2017 12:29 PM      START taking these medications    Details   oxycodone-acetaminophen (PERCOCET) 7.5-325 mg per tablet Take 1 tablet by mouth every 4 (four) hours as needed., Starting 3/8/2017, Until Discontinued, Print         CONTINUE these medications which have NOT CHANGED    Details   dicyclomine (BENTYL) 20 mg tablet Take 1 tablet (20 mg total) by mouth every 6 (six) hours., Starting 2/10/2017, Until Sun 3/12/17, Normal      hydrocodone-acetaminophen 10-325mg (NORCO)  mg Tab Take 1 tablet by mouth every 4 (four) hours as needed for Pain., Starting 1/9/2017, Until Discontinued, Normal      Lactobacillus rhamnosus GG (CULTURELLE) 10 billion cell capsule Take 1 capsule by mouth once daily., Until Discontinued, Historical Med      polyethylene glycol (GLYCOLAX) 17 gram PwPk Take by mouth., Until Discontinued, Historical Med      valsartan (DIOVAN) 320 MG tablet Take 1 tablet (320 mg total) by mouth once daily., Starting 11/17/2016, Until Discontinued, Normal             Discharge Procedure Orders  Diet general     Lifting restrictions     Call MD for:  temperature >100.4     Call MD for:  persistent nausea and vomiting or diarrhea     Call MD for:  severe uncontrolled pain     Call MD for:  redness, tenderness, or signs of infection (pain, swelling, redness, odor or green/yellow discharge around incision site)        Follow-up Information     Follow up with Mateusz Riddle MD In 2 weeks.    Specialty:  Colon and Rectal Surgery    Contact information:    [facility]  [facility] [id number] [phone]            Manolo Jessica MD

## 2017-03-13 ENCOUNTER — PATIENT OUTREACH (OUTPATIENT)
Dept: ADMINISTRATIVE | Facility: CLINIC | Age: 42
End: 2017-03-13
Payer: MEDICARE

## 2017-03-13 NOTE — PATIENT INSTRUCTIONS
Discharge Instructions: Caring for Your Abdominal Incision  You are going home with stitches (sutures), surgical staples, special strips of tape, or surgical skin glue. One of these items was used to close your incision, help stop bleeding, and speed healing. Follow the tips on this sheet to help your incision heal.   Home care  · Clean your work area:  ¨ Put pets in another room.  ¨ Use soap and water to clean the surface youll be working on.  ¨ Spread a clean cloth or paper towel over the surface.  ¨ Move away from the clean surface if you need to cough or sneeze.  · Gather your supplies:  ¨ Packaged dressing for your wound  ¨ Irrigation solutions (if using these)  ¨ Pair of scissors (cleaned with soap and water)  ¨ Medical tape  ¨ Disposable gloves (2 pairs)  ¨ Clean plastic trash bag (open it before you wash your hands)  · Wash your hands:  ¨ Use liquid soap.  ¨ Work up a good lather and scrub for 1 to 2 minutes.  ¨ Be sure to scrub between your fingers and under your nails.  ¨ Rinse with warm water, keeping your fingers pointed down.  ¨ Use a clean paper towel to dry your hands and turn off the faucet.  · Prepare your dressing supplies:  ¨ Peel back the edges of the dressing packages. Pour any irrigation solutions into solution cups.  ¨ Cut each piece of tape 4 inches longer than the dressing.  · Remove the old dressing:  ¨ Put on disposable gloves.  ¨ Loosen the tape on the dressing by pulling gently toward the incision. Remove the dressing one layer at a time. Put it in the plastic bag immediately.  ¨ Remove your gloves and put them in the plastic bag. Wash your hands.  ¨ Put on a new pair of gloves.  · Clean and dress the incision:  ¨ Clean the incision and apply a new dressing as directed.  ¨ Do not remove the special strips of tape even if they are starting to loosen.   ¨ Put all used supplies in the plastic bag. Remove your gloves last and put them in the plastic bag. Seal the bag and put it in the  trash.  ¨ Be sure to wash your hands again.  Care for specific closures  Follow these guidelines unless your healthcare provider tells you otherwise:  · Sutures or staples. Once you no longer need to keep these dry, clean the wound daily, using the instructions listed above. First remove the bandage using clean hands. Then wash the area gently with soap and warm water. Use a wet cotton swab to loosen and remove any blood or crust that forms. After cleaning, put a thin layer of antibiotic ointment on. Then put on a new bandage.  · Skin glue. Dont put liquid, ointment, or cream on your wound while the glue is in place. Avoid activities that cause heavy sweating. Protect the wound from sunlight. Do not scratch, rub, or pick at the glue. Do not put tape directly over the glue. The glue should peel off within 5 to 10 days.  · Surgical tape. Keep the area dry. If it gets wet, blot the area dry with a clean towel. Surgical tape usually falls off within 7 to 10 days. If it has not fallen off after 10 days, contact your healthcare provider before taking it off yourself. If you are told to remove the tape, put mineral oil or petroleum jelly on a cotton ball. Gently rub the tape until it is removed.  Follow-up care  Follow up with your healthcare provider to ask how long sutures or staples should be left in place. Be sure to return for suture or staple removal as directed.  If tape closures were used, remove them yourself when your provider recommends if they have not fallen off on their own. If skin glue was used, the glue will wear off by itself.     When to call your healthcare provider  Call your healthcare provider right away if you have any of the following:  · More pain, bleeding, redness, swelling, or foul-smelling discharge around the incision area  · Fever of 100.4°F (38°C) or higher, or as directed by your healthcare provider  · Shaking chills  · Vomiting or nausea that doesnt go away  · Numbness, coldness, or  tingling around the incision area, or changes in skin color  · Opening of sutures or wound  · Stitches or staples come apart or fall out or surgical tape falls off before 7 days, or as directed by your provider   Date Last Reviewed: 8/1/2016  © 6980-3378 [employer]. [address] [place] [address]. All rights reserved. This information is not intended as a substitute for professional medical care. Always follow your healthcare professional's instructions.

## 2017-03-21 RX ORDER — OXYCODONE AND ACETAMINOPHEN 7.5; 325 MG/1; MG/1
1 TABLET ORAL EVERY 4 HOURS PRN
Qty: 50 TABLET | Refills: 0 | Status: SHIPPED | OUTPATIENT
Start: 2017-03-21 | End: 2017-03-31 | Stop reason: SDUPTHER

## 2017-03-22 ENCOUNTER — OFFICE VISIT (OUTPATIENT)
Dept: SURGERY | Facility: CLINIC | Age: 42
End: 2017-03-22
Payer: MEDICARE

## 2017-03-22 VITALS
SYSTOLIC BLOOD PRESSURE: 147 MMHG | BODY MASS INDEX: 19.61 KG/M2 | HEIGHT: 64 IN | HEART RATE: 94 BPM | DIASTOLIC BLOOD PRESSURE: 94 MMHG | WEIGHT: 114.88 LBS

## 2017-03-22 DIAGNOSIS — K94.13 ILEOSTOMY DYSFUNCTION: Primary | ICD-10-CM

## 2017-03-22 PROCEDURE — 99213 OFFICE O/P EST LOW 20 MIN: CPT | Mod: PBBFAC | Performed by: COLON & RECTAL SURGERY

## 2017-03-22 PROCEDURE — 99024 POSTOP FOLLOW-UP VISIT: CPT | Mod: ,,, | Performed by: COLON & RECTAL SURGERY

## 2017-03-22 PROCEDURE — 99999 PR PBB SHADOW E&M-EST. PATIENT-LVL III: CPT | Mod: PBBFAC,,, | Performed by: COLON & RECTAL SURGERY

## 2017-03-22 NOTE — PROGRESS NOTES
Patient ID:  Marco Valadez Jr. is a 41 y.o. male     Chief Complaint:   Chief Complaint   Patient presents with    Follow-up        HPI: 3/6/17 Continent ileostomy revision. Doing very well, easily cannulates and is continent      Continues to leak from stoma. Had endosocpy last visit with bilobed pouch May not be adequately emptying      10/31/16 : removal of ileoanal pouch and creation of continent ileostomy    11/12/16: exp lap for small bowel volvulus and SBO, lysis of adhesions, luis e gasrostomy tube. Bowels working Muro tube removed and replaced without difficuty. No need for G-tube      9/26/16 EUA, placement of setons. 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 Has drainage and nausea.    Had IPAA by Dr Marinelli in 2012 had several leaks and 13 operations. Now with frequent stools and seton in anus. Interested in repair of Gutiérrez Pouch.    ROS:        Constitutional: No fever, chills, activity or appetite change.      HENT: No hearing loss, facial swelling, neck pain or stiffness.       Eyes: No discharge, itching and visual disturbance.      Respiratory: No apnea, cough, choking or shortness of breath.       Cardiovascular: No leg swelling or chest pain      Gastrointestinal: No abdominal distention or change in bowel habbits     Genitourinary: No dysuria, frequency or flank pain.      Musculoskeletal: No arthralgias or gait problem.      Neurological: No dizziness, seizures or weakness.      Hematological: No adenopathy.      Psychiatric/Behavioral: No hallucinations or behavioral problems.       PE:    APPEARANCE: Well nourished, well developed, in no acute distress.   CHEST: Lungs clear. Normal respiratory effort.  CARDIOVASCULAR: Normal rhythm. No edema.  ABDOMEN:   healed incision continent ileostomy in RLQ,    Rectum:  Cream on skin vessel lopp in superficial fistula  Musculoskeletal: Symmetric, normal range of motion and strength.   Neurological: Alert and oriented to person, place, and  time. Normal reflexes.   Skin: Skin is warm and dry.   Psychiatric: Normal mood and affect. Behavior is normal and appropriate.          Impression:  Continent ileostomy    PLAN:  RTC 1 year or RPN.

## 2017-03-31 RX ORDER — HYDROCODONE BITARTRATE AND ACETAMINOPHEN 10; 325 MG/1; MG/1
1 TABLET ORAL EVERY 4 HOURS PRN
Qty: 60 TABLET | Refills: 0 | OUTPATIENT
Start: 2017-03-31

## 2017-03-31 RX ORDER — OXYCODONE AND ACETAMINOPHEN 7.5; 325 MG/1; MG/1
1 TABLET ORAL EVERY 4 HOURS PRN
Qty: 50 TABLET | Refills: 0 | Status: SHIPPED | OUTPATIENT
Start: 2017-03-31 | End: 2017-05-04 | Stop reason: SDUPTHER

## 2017-04-02 ENCOUNTER — PATIENT MESSAGE (OUTPATIENT)
Dept: SURGERY | Facility: CLINIC | Age: 42
End: 2017-04-02

## 2017-05-04 RX ORDER — OXYCODONE AND ACETAMINOPHEN 7.5; 325 MG/1; MG/1
1 TABLET ORAL EVERY 4 HOURS PRN
Qty: 50 TABLET | Refills: 0 | Status: SHIPPED | OUTPATIENT
Start: 2017-05-04 | End: 2019-08-13

## 2017-05-30 ENCOUNTER — PATIENT MESSAGE (OUTPATIENT)
Dept: RESEARCH | Facility: HOSPITAL | Age: 42
End: 2017-05-30

## 2017-07-17 ENCOUNTER — PATIENT MESSAGE (OUTPATIENT)
Dept: SURGERY | Facility: CLINIC | Age: 42
End: 2017-07-17

## 2017-08-17 ENCOUNTER — PATIENT MESSAGE (OUTPATIENT)
Dept: SURGERY | Facility: CLINIC | Age: 42
End: 2017-08-17

## 2017-08-17 ENCOUNTER — TELEPHONE (OUTPATIENT)
Dept: WOUND CARE | Facility: CLINIC | Age: 42
End: 2017-08-17

## 2017-08-17 NOTE — TELEPHONE ENCOUNTER
----- Message from Nina Gunter sent at 8/17/2017 12:56 PM CDT -----  Contact: pt#[phone]  Pt states that he's experiencing a burning sensation to skin around ostomy. Please call

## 2017-08-17 NOTE — TELEPHONE ENCOUNTER
"Pt had called me but also messaged Dr Villegas he said, described issue with skin and pain at site of continent stoma. Sounds like he  May have some skin breakdown as base of "stoma" and this is what burns,   However he describes an inside pain associated with exercise  , advised to monitor, discuss with Dr VILLEGAS and make appt to see him if persists  "

## 2017-09-18 ENCOUNTER — PATIENT MESSAGE (OUTPATIENT)
Dept: SURGERY | Facility: CLINIC | Age: 42
End: 2017-09-18

## 2017-09-18 ENCOUNTER — TELEPHONE (OUTPATIENT)
Dept: SURGERY | Facility: CLINIC | Age: 42
End: 2017-09-18

## 2017-09-18 NOTE — TELEPHONE ENCOUNTER
----- Message from Natalie Gomez sent at 9/18/2017 11:49 AM CDT -----  Contact: Self- [phone]  Venkatesh- pt called to speak with staff- says his ostomy bag is leaking and would like to know what he should do- please call pt back at [phone]

## 2017-09-18 NOTE — TELEPHONE ENCOUNTER
Informed Dr. Riddle that pt had a leak in the middle of the night and pt stated that he drinks protein shakes and eats sandwiches before bedtime. Returned pt call and notified him to place cath and leave in for a couple of days per Dr. Riddle. Also notified pt not to eat right before bed.

## 2017-10-16 ENCOUNTER — TELEPHONE (OUTPATIENT)
Dept: SURGERY | Facility: CLINIC | Age: 42
End: 2017-10-16

## 2017-10-16 NOTE — TELEPHONE ENCOUNTER
Spoke to pt about abd pain. Pt stated he no longer has the pain and is thinking it was because he had a large dinner last night (steak and asparagus) and also drank a weight gain protein shake right after. Advised pt not to have the shake so close to a large meal. Also advised to call if abd pain persists.

## 2017-10-16 NOTE — TELEPHONE ENCOUNTER
----- Message from Shaylee Madera sent at 10/16/2017  8:08 AM CDT -----  Contact: self- [phone]  Venkatesh  - has abdominal pain - wants to talk to dr chaidez's nurse - please call patient at  [phone]

## 2018-04-21 ENCOUNTER — PATIENT MESSAGE (OUTPATIENT)
Dept: SURGERY | Facility: CLINIC | Age: 43
End: 2018-04-21

## 2018-04-22 ENCOUNTER — PATIENT MESSAGE (OUTPATIENT)
Dept: SURGERY | Facility: CLINIC | Age: 43
End: 2018-04-22

## 2018-04-23 ENCOUNTER — TELEPHONE (OUTPATIENT)
Dept: SURGERY | Facility: CLINIC | Age: 43
End: 2018-04-23

## 2018-04-23 NOTE — TELEPHONE ENCOUNTER
----- Message from Shaylee Madera sent at 4/23/2018  2:07 PM CDT -----  Contact: self   Riddle - wants to be seen sooner than may 23 - has nausea - no appetite - please call patient at

## 2018-04-24 ENCOUNTER — TELEPHONE (OUTPATIENT)
Dept: SURGERY | Facility: CLINIC | Age: 43
End: 2018-04-24

## 2018-04-24 NOTE — TELEPHONE ENCOUNTER
----- Message from Natalie Gomez sent at 4/24/2018  1:38 PM CDT -----  Contact: Self- [phone]  Venkatesh- pt called to determine if he can be seen tomorrow 4/25 instead of Thursday 4/26- please call pt back at [phone]

## 2018-04-25 ENCOUNTER — OFFICE VISIT (OUTPATIENT)
Dept: SURGERY | Facility: CLINIC | Age: 43
End: 2018-04-25
Payer: MEDICARE

## 2018-04-25 VITALS
SYSTOLIC BLOOD PRESSURE: 148 MMHG | BODY MASS INDEX: 25.52 KG/M2 | HEIGHT: 64 IN | DIASTOLIC BLOOD PRESSURE: 96 MMHG | WEIGHT: 149.5 LBS | HEART RATE: 78 BPM

## 2018-04-25 DIAGNOSIS — Z93.2 ILEOSTOMY IN PLACE: Primary | ICD-10-CM

## 2018-04-25 PROCEDURE — 99213 OFFICE O/P EST LOW 20 MIN: CPT | Mod: S$PBB,,, | Performed by: COLON & RECTAL SURGERY

## 2018-04-25 PROCEDURE — 99213 OFFICE O/P EST LOW 20 MIN: CPT | Mod: PBBFAC | Performed by: COLON & RECTAL SURGERY

## 2018-04-25 PROCEDURE — 99999 PR PBB SHADOW E&M-EST. PATIENT-LVL III: CPT | Mod: PBBFAC,,, | Performed by: COLON & RECTAL SURGERY

## 2018-04-25 NOTE — PROGRESS NOTES
Patient ID:  Marco Valadez Jr. is a 42 y.o. male     Chief Complaint:   Chief Complaint   Patient presents with    Follow-up        HPI: had an episode of nausea and abdominal cramps after execising. Resolved witout sequalae.    3/6/17 Continent ileostomy revision. Doing very well, easily cannulates and is continent      Continues to leak from stoma. Had endosocpy last visit with bilobed pouch May not be adequately emptying      10/31/16 : removal of ileoanal pouch and creation of continent ileostomy    11/12/16: exp lap for small bowel volvulus and SBO, lysis of adhesions, luis e gasrostomy tube. Bowels working Muro tube removed and replaced without difficuty. No need for G-tube      9/26/16 EUA, placement of setons. 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 Has drainage and nausea.    Had IPAA by Dr Marinelli in 2012 had several leaks and 13 operations. Now with frequent stools and seton in anus. Interested in repair of Gutiérrez Pouch.    ROS:        Constitutional: No fever, chills, activity or appetite change.      HENT: No hearing loss, facial swelling, neck pain or stiffness.       Eyes: No discharge, itching and visual disturbance.      Respiratory: No apnea, cough, choking or shortness of breath.       Cardiovascular: No leg swelling or chest pain      Gastrointestinal: No abdominal distention or change in bowel habbits     Genitourinary: No dysuria, frequency or flank pain.      Musculoskeletal: No arthralgias or gait problem.      Neurological: No dizziness, seizures or weakness.      Hematological: No adenopathy.      Psychiatric/Behavioral: No hallucinations or behavioral problems.       PE:    APPEARANCE: Well nourished, well developed, in no acute distress.   CHEST: Lungs clear. Normal respiratory effort.  CARDIOVASCULAR: Normal rhythm. No edema.  ABDOMEN:   healed incision continent ileostomy in RLQ,    Rectum:  Cream on skin vessel lopp in superficial fistula  Musculoskeletal: Symmetric,  normal range of motion and strength.   Neurological: Alert and oriented to person, place, and time. Normal reflexes.   Skin: Skin is warm and dry.   Psychiatric: Normal mood and affect. Behavior is normal and appropriate.          Impression:  Continent ileostomy    PLAN:  RTC 1 year or RPN.

## 2018-09-25 ENCOUNTER — TELEPHONE (OUTPATIENT)
Dept: SURGERY | Facility: CLINIC | Age: 43
End: 2018-09-25

## 2018-09-25 NOTE — TELEPHONE ENCOUNTER
----- Message from Hany Hood sent at 9/25/2018  3:28 PM CDT -----  Contact: pt   Needs Advice    Reason for call: pt called and wants to know can dr. Riddle fill out form stating he's disable         Communication Preference: pt     Additional Information: pt wants form to be faxed at [phone]

## 2018-09-26 ENCOUNTER — PATIENT MESSAGE (OUTPATIENT)
Dept: SURGERY | Facility: CLINIC | Age: 43
End: 2018-09-26

## 2018-10-01 ENCOUNTER — TELEPHONE (OUTPATIENT)
Dept: SURGERY | Facility: CLINIC | Age: 43
End: 2018-10-01

## 2018-10-01 NOTE — TELEPHONE ENCOUNTER
Spoke with pt and he was asking for a letter for the [employer] for handicap [employer]. Informed pt I sent message to Dr. Riddle for letter and will let him know.

## 2018-10-01 NOTE — TELEPHONE ENCOUNTER
----- Message from Hany Hood sent at 10/1/2018  3:48 PM CDT -----  Contact: Pt:[phone]  .Needs Advice    Reason for call:Pt called and states he would like to speak with the nurse in regards to having a form signed for disability .         Communication Preference:Pt:[phone]    Additional Information:

## 2018-10-23 NOTE — NURSING
"Dr. Cardoso (general sx on call) notified patient's BP 82/45 automatic and 74/48 manually. Patient's HR 99. Patient reports feeling "weak" and denies any other symptoms at this time. Patient talking and AOx4. No new orders received.     " none

## 2019-04-15 ENCOUNTER — TELEPHONE (OUTPATIENT)
Dept: SURGERY | Facility: CLINIC | Age: 44
End: 2019-04-15

## 2019-04-15 NOTE — TELEPHONE ENCOUNTER
Returned call. No answer. Left message to please call back to discuss concerns and establish a new provider. Dr. Riddle has retired.

## 2019-04-15 NOTE — TELEPHONE ENCOUNTER
----- Message from Nina Gunter sent at 4/12/2019  8:35 AM CDT -----  Contact: pt [phone]  Needs Advice    Reason for call:pt states that pouch is leaking and he wants to speak with a nurse about it          Communication Preference:call    Additional Information:pt of Dr Riddle

## 2019-07-15 ENCOUNTER — TELEPHONE (OUTPATIENT)
Dept: SURGERY | Facility: CLINIC | Age: 44
End: 2019-07-15

## 2019-07-15 NOTE — TELEPHONE ENCOUNTER
----- Message from Vneice Simms sent at 7/15/2019  4:42 PM CDT -----  Contact: self  Pt called to speak with nurse in office to [facility] np appt.                  Pt callback number [phone]

## 2019-08-13 ENCOUNTER — OFFICE VISIT (OUTPATIENT)
Dept: SURGERY | Facility: CLINIC | Age: 44
End: 2019-08-13
Payer: MEDICARE

## 2019-08-13 VITALS
HEART RATE: 82 BPM | DIASTOLIC BLOOD PRESSURE: 81 MMHG | SYSTOLIC BLOOD PRESSURE: 156 MMHG | WEIGHT: 138.69 LBS | BODY MASS INDEX: 23.68 KG/M2 | HEIGHT: 64 IN

## 2019-08-13 DIAGNOSIS — R15.9 FULL INCONTINENCE OF FECES: Primary | ICD-10-CM

## 2019-08-13 PROCEDURE — 99213 PR OFFICE/OUTPT VISIT, EST, LEVL III, 20-29 MIN: ICD-10-PCS | Mod: S$PBB,,, | Performed by: COLON & RECTAL SURGERY

## 2019-08-13 PROCEDURE — 99213 OFFICE O/P EST LOW 20 MIN: CPT | Mod: S$PBB,,, | Performed by: COLON & RECTAL SURGERY

## 2019-08-13 PROCEDURE — 99213 OFFICE O/P EST LOW 20 MIN: CPT | Mod: PBBFAC | Performed by: COLON & RECTAL SURGERY

## 2019-08-13 PROCEDURE — 99999 PR PBB SHADOW E&M-EST. PATIENT-LVL III: ICD-10-PCS | Mod: PBBFAC,,, | Performed by: COLON & RECTAL SURGERY

## 2019-08-13 PROCEDURE — 99999 PR PBB SHADOW E&M-EST. PATIENT-LVL III: CPT | Mod: PBBFAC,,, | Performed by: COLON & RECTAL SURGERY

## 2019-08-13 RX ORDER — METOPROLOL SUCCINATE 100 MG/1
TABLET, EXTENDED RELEASE ORAL
COMMUNITY
Start: 2019-08-04

## 2019-08-13 RX ORDER — PHENAZOPYRIDINE HYDROCHLORIDE 200 MG/1
200 TABLET, FILM COATED ORAL 3 TIMES DAILY PRN
Refills: 1 | COMMUNITY
Start: 2019-07-17

## 2019-08-13 RX ORDER — NITROFURANTOIN (MACROCRYSTALS) 100 MG/1
CAPSULE ORAL
Refills: 5 | COMMUNITY
Start: 2019-07-17

## 2019-08-13 NOTE — PROGRESS NOTES
HPI:  Marco Valadez Jr. is a 43 y.o. male with history of ulcerative colitis and a former patient of Dr Riddle's with a continent ileostomy. He has undergone multiple prior surgeries:  3/6/17 Continent ileostomy revision.  10/31/16 : removal of ileoanal pouch and creation of continent ileostomy  11/12/16: exp lap for small bowel volvulus and SBO, lysis of adhesions, luis e gasrostomy tube. Bowels working Muro tube removed and replaced without difficuty. No need for G-tube  9/26/16 EUA, placement of setons. 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 Has drainage and nausea.     Had IPAA by Dr Marinelli in 2012 had several leaks and 13 operations. Now with frequent stools and seton in anus. Interested in repair of Gutiérrez Pouch.    He comes to clinic today to establish new care now that Dr Riddle no longer practices here.  He endorses increasing leaking from his K pouch. He says this started a few months ago when he was hospitalized for kidney injury and urinary infections, for which he now has bilateral ureteral stents. He reports increased leaking from his pouch, even if he catheterizes more regularly. He says this is somewhat more liquid consistency since he's been on antibiotics (macrobid), but often it isn't too liquidy. Leakage is worse when he is having meals.    Past Medical History:   Diagnosis Date    Fistula     Fistula     Ulcerative colitis         Past Surgical History:   Procedure Laterality Date    EVACUATION-HEMATOMA  11/12/2016    Performed by Mateusz Riddle MD at [facility] OR [address] [place] [address]    EXAM UNDER ANESTHESIA  9/26/2016    Per[facility] at [facility]    EXCISION - ILEO ANAL POUCH N/A 10/31/2016    Per[facility] by [facility] at [facility]    EXPLORATORY-LAPAROTOMY, lysis of adhesions N/A 11/12/2016    Performed by [facility] at [facility]    ILEOSTOMY      ILEOSTOMY N/A 10/31/2016    Performed by [facility]z Riddle MD at [facility] OR [address] [place] [address]     INSERTION-GASTROSTOMY TUBE  11/12/2016    [facility] at [facility] OR [facility]    PLACEMENT-SETON DRAIN Left 9/26/2016    [facility]    REVISION-ILEOSTOMY, complicated, revise/repair continent ileostomy N/A 3/6/2017    [facility]    SHOULDER SURGERY Left        Review of patient's allergies indicates:   Allergen Reactions    Penicillins Other (See Comments)     Shakes uncontrollably       Family History   Problem Relation Age of Onset    Hypertension Mother     Hypertension Father        Social History     Socioeconomic History    Marital status:      Spouse name: Not on file    Number of children: Not on file    Years of education: Not on file    Highest education level: Not on file   Occupational History    Not on file   Social Needs    Financial resource strain: Not on file    Food insecurity:     Worry: Not on file     Inability: Not on file    Transportation needs:     Medical: Not on file     Non-medical: Not on file   Tobacco Use    Smoking status: Never Smoker    Smokeless tobacco: Never Used   Substance and Sexual Activity    Alcohol use: No    Drug use: No    Sexual activity: Not on file   Lifestyle    Physical activity:     Days per week: Not on file     Minutes per session: Not on file    Stress: Not on file   Relationships    Social connections:     Talks on phone: Not on file     Gets together: Not on file     Attends Spiritism service: Not on file     Active member of club or organization: Not on file     Attends meetings of clubs or organizations: Not on file     Relationship status: Not on file   Other Topics Concern    Not on file   Social History Narrative    Not on file       ROS:  GENERAL: No fever, chills, fatigability or weight loss.  Integument: No rashes, redness, icterus  CHEST: Denies MENDIOLA, cyanosis, wheezing, cough and sputum production.  CARDIOVASCULAR: Denies chest  pain, PND, orthopnea or reduced exercise tolerance.  GI: Denies abd pain, dysphagia, nausea, vomiting, no hematemesis   : Denies burning on urination, no hematuria, no bacteriuria  MSK: No deformities, swelling, joint pain swelling  Neurologic: No HAs, seizures, weakness, paresthesias, gait problems    PE:  General appearance- well-appearing, no apparent distress  There were no vitals taken for this visit.  Sclera/ Skin anicteric  Neck supple trachea midline   Chest symmetric, nl excursion, no retractions, breathing comfortably  Abdomen  ND soft NT.  no masses, no organomegaly, continent stoma pink and not actively leaking  EXT - no CCE  Neuro:  Mood/ affect nl, alert and oriented x 3, moves all ext's, gait nl      Assessment:  43 year old male with K-pouch and increased leaking. This could either be attributed to increased liquid stools due to chronic antibiotics or to pouch slippage. Unfortunately, no surgeons at this institution are currently performing Gutiérrez pouch surgeries or revisions, and if surgical treatment is necessary for this, he would either need to locate another surgeon (we mentioned [place] and [facility] as two possible locations) or have the pouch converted to an end ileostomy.    Plan:  1. Start immodium 2mg daily  2. Follow up with local urologist to determine continued need for stents and continued antibiotics.   3. Return to clinic 3 months.    I have personally obtained a history and performed a physical exam with and independent to my resident and discussed the findings and plan with the patient.  I agree with the above findings and plan with the following exceptions:  None    H, Mateusz Araiza MD, FACS, FASCRS  Staff Surgeon  Dept of Colon and Rectal Surgery  [facility] [place], [place]

## 2019-12-09 ENCOUNTER — TELEPHONE (OUTPATIENT)
Dept: SURGERY | Facility: HOSPITAL | Age: 44
End: 2019-12-09

## 2019-12-09 RX ORDER — METRONIDAZOLE 250 MG/1
250 TABLET ORAL 3 TIMES DAILY
Qty: 90 TABLET | Refills: 2 | Status: SHIPPED | OUTPATIENT
Start: 2019-12-09 | End: 2019-12-19

## 2019-12-09 NOTE — TELEPHONE ENCOUNTER
----- Message from Kate Kohli RN sent at 12/9/2019  9:17 AM CST -----  Contact: pt#[phone]      ----- Message -----  From: Nina Gunter  Sent: 12/9/2019   9:12 AM CST  To: Jose G Child Staff    Refill:    Metronidazole 250 mg     [facility] - [place], [place] - [address] [facility]  [address] [place] [address] [place] [address]  Phone: [phone] Fax: [phone]

## 2019-12-09 NOTE — TELEPHONE ENCOUNTER
Long conversation with pt about Gutiérrez pouch, pouch is leaking and having more liquidly stool, in past Venkatesh gave him some flagyl, pres sent, also told him to look into doctor in [place] inc case incontinence does not improve, he may need surgery to fix valve and no surgeons here do that surgery

## 2020-01-09 RX ORDER — METRONIDAZOLE 250 MG/1
250 TABLET ORAL 3 TIMES DAILY
Qty: 30 TABLET | Refills: 3 | Status: SHIPPED | OUTPATIENT
Start: 2020-01-09 | End: 2020-01-19

## 2020-01-13 ENCOUNTER — TELEPHONE (OUTPATIENT)
Dept: SURGERY | Facility: HOSPITAL | Age: 45
End: 2020-01-13

## 2020-01-13 ENCOUNTER — TELEPHONE (OUTPATIENT)
Dept: SURGERY | Facility: CLINIC | Age: 45
End: 2020-01-13

## 2020-01-13 NOTE — TELEPHONE ENCOUNTER
Pt reports he has pouchitis, with frequent loose green stools, small amounts of blood noted in pouch, prescribed flagyl on 1/9.  Will forward message to RORY Ledezma NP.

## 2020-01-13 NOTE — TELEPHONE ENCOUNTER
----- Message from Kate Kohli RN sent at 1/13/2020  9:46 AM CST -----  Contact: pt: [phone]  Pt currently taking flagyl, reports he is experiencing pouchitis, denies pain, but has liquid green stools with minimal blood.  Can you please follow up with him?    Kate    ----- Message -----  From: Brady Olivera  Sent: 1/13/2020   9:34 AM CST  To: Jose G Child Staff    Pt called to speak with nurse re his pouch state he's been having pouchitis      Please contact pt: [phone]

## 2020-01-13 NOTE — TELEPHONE ENCOUNTER
Pt of Dr. Riddel, he has continent ileostomy, [id number] improving but has had seveal bouts over the last year, told him to take probiotics and cont flagyl.  Also told him to look into doctors at [facility] and [facility] because no surgeons here can work on Gutiérrez pouches, did see Dr. Araiza in July 2019

## 2020-01-14 ENCOUNTER — TELEPHONE (OUTPATIENT)
Dept: SURGERY | Facility: CLINIC | Age: 45
End: 2020-01-14

## 2020-01-14 NOTE — TELEPHONE ENCOUNTER
Spoke with patient. Stated only took 2 Flagyl from recent RX. Symptoms have returned. Instructed to take RX as prescribed- 3 pills PO TID for 10 days. Call clinic after 10 days with an update.     ----- Message from Nina Gunter sent at 1/14/2020  8:37 AM CST -----  Contact: pt [phone]  Marco Valadez calling regarding pouch. He wants to know if Dr Araiza scope the pouch. Please call

## 2020-01-28 ENCOUNTER — TELEPHONE (OUTPATIENT)
Dept: SURGERY | Facility: CLINIC | Age: 45
End: 2020-01-28

## 2020-01-28 DIAGNOSIS — K50.119 CROHN'S DISEASE OF COLON WITH COMPLICATION: Primary | ICD-10-CM

## 2020-01-28 NOTE — TELEPHONE ENCOUNTER
----- Message from Alma De La Fuente MA sent at 1/28/2020  8:32 AM CST -----  Contact: [phone]  Pt would like to know if he can get a refill on Flagyl sent to:     [facility] [address] [place] [address], [place] - [address] [facility] [phone] (Phone)  [phone] (Fax)      Also pt wants to know if you can fax a referral to Dr Riddle, he would like to see him in [place]     Fax referral to: [phone]

## 2020-01-28 NOTE — TELEPHONE ENCOUNTER
Pt lost his empty bottle of Flagyl. He thought he needed it for refills. Told him to call the pharmacy and it will be in their records. He wants to see Dr Riddle but when he calls his office he's told he needs a referral. Told pt I will send the referral over today.

## 2020-02-13 ENCOUNTER — TELEPHONE (OUTPATIENT)
Dept: SURGERY | Facility: CLINIC | Age: 45
End: 2020-02-13

## 2021-01-11 ENCOUNTER — TELEPHONE (OUTPATIENT)
Dept: NEPHROLOGY | Facility: CLINIC | Age: 46
End: 2021-01-11

## 2021-05-10 ENCOUNTER — PATIENT MESSAGE (OUTPATIENT)
Dept: RESEARCH | Facility: HOSPITAL | Age: 46
End: 2021-05-10

## 2023-01-27 NOTE — TELEPHONE ENCOUNTER
-- DO NOT REPLY / DO NOT REPLY ALL --  -- Message is from [facility]agement Center Operations (EC[facility]) --    ONLY TO BE USED WITHIN A REFILL MEDICATION ENCOUNTER    Med Refill  Is the patient currently having any symptoms?: No/Non-Emergent symptoms    Name of medication requested: See pended med    Has patient contacted the pharmacy? Not Applicable-Patient states reason is Needs Provider Approval    Is this the first request for the medication in the last 48 hours?: Yes      Patient is requesting a medication refill - medication is on active list      Full name of the provider who ordered the medication: ?Erin Carpio    [facility] site name / Account # for provider: [id number] [place] [address]    Preferred Pharmacy: Pharmacy  [facility] [address] [place] [address]    Patient confirmed the above pharmacy as correct?  Yes      Caller Information       Type Contact Phone/Fax    01/27/2023 03:58 PM CST Phone (Incoming) Marivel Umanzor (Self) [phone] (M)          Alternative phone number: N/A    Can a detailed message be left?: Yes    Patient is completely out of medication: Verify if patient is currently experiencing symptoms. If patient is symptomatic, proceed with front end triage instead of medication refill. If patient is not symptomatic but is completely out of medication, adrianne as High priority when routing. Inform patient: “Please call back with any questions or concerns and if your condition becomes life threatening, you should seek immediate medical assistance by calling 911 or going to the Emergency Department for evaluation.”    Inform all patients: \"If the clinical team needs to contact you regarding this refill, please be aware the return phone call may come from an unidentified or out of state phone number and your refill request will be addressed as soon as the clinical team reviews your message.\"   Returned call. No answer. Left message to call back.

## (undated) DEVICE — STAPLER INT PROX TX 60X4.8MM

## (undated) DEVICE — SUT VICRYL PLUS 3-0 FS1 27

## (undated) DEVICE — SUT 0 54IN CHROMIC GUT

## (undated) DEVICE — SUT CTD VICRYL VIL BR SH 27

## (undated) DEVICE — SEE MEDLINE ITEM 156902

## (undated) DEVICE — SUT PDS II 1 TP-1 VIL

## (undated) DEVICE — SEE MEDLINE ITEM 154981

## (undated) DEVICE — SEE MEDLINE ITEM 146417

## (undated) DEVICE — ADHESIVE DERMABOND ADVANCED

## (undated) DEVICE — SEE MEDLINE ITEM 152487

## (undated) DEVICE — SUT 4/0 36IN ETHIBOND EXCE

## (undated) DEVICE — ELECTRODE REM PLYHSV RETURN 9

## (undated) DEVICE — SYR ONLY LUER LOCK 20CC

## (undated) DEVICE — SEE L#95700

## (undated) DEVICE — CART STAPLE RELD PROX 60X4.8MM

## (undated) DEVICE — NDL HYPO A BEVEL 22X1 1/2

## (undated) DEVICE — SUT 0 27IN CHROMIC GUT CT-1

## (undated) DEVICE — DRAPE ABDOMINAL TIBURON 14X11

## (undated) DEVICE — TRAY CATH UM FOLEY SIL W 16FR